# Patient Record
Sex: MALE | Race: WHITE | NOT HISPANIC OR LATINO | Employment: OTHER | URBAN - METROPOLITAN AREA
[De-identification: names, ages, dates, MRNs, and addresses within clinical notes are randomized per-mention and may not be internally consistent; named-entity substitution may affect disease eponyms.]

---

## 2017-04-18 ASSESSMENT — MIFFLIN-ST. JEOR: SCORE: 1753.6

## 2017-04-19 ENCOUNTER — ANESTHESIA - HEALTHEAST (OUTPATIENT)
Dept: SURGERY | Facility: CLINIC | Age: 82
End: 2017-04-19

## 2017-04-20 ENCOUNTER — HOME CARE/HOSPICE - HEALTHEAST (OUTPATIENT)
Dept: HOME HEALTH SERVICES | Facility: HOME HEALTH | Age: 82
End: 2017-04-20

## 2017-04-20 ENCOUNTER — SURGERY - HEALTHEAST (OUTPATIENT)
Dept: SURGERY | Facility: CLINIC | Age: 82
End: 2017-04-20

## 2017-04-20 ASSESSMENT — MIFFLIN-ST. JEOR: SCORE: 1753.6

## 2017-04-24 ENCOUNTER — HOME CARE/HOSPICE - HEALTHEAST (OUTPATIENT)
Dept: HOME HEALTH SERVICES | Facility: HOME HEALTH | Age: 82
End: 2017-04-24

## 2017-04-25 ENCOUNTER — HOME CARE/HOSPICE - HEALTHEAST (OUTPATIENT)
Dept: HOME HEALTH SERVICES | Facility: HOME HEALTH | Age: 82
End: 2017-04-25

## 2017-04-26 ENCOUNTER — HOME CARE/HOSPICE - HEALTHEAST (OUTPATIENT)
Dept: HOME HEALTH SERVICES | Facility: HOME HEALTH | Age: 82
End: 2017-04-26

## 2017-04-27 ENCOUNTER — HOME CARE/HOSPICE - HEALTHEAST (OUTPATIENT)
Dept: HOME HEALTH SERVICES | Facility: HOME HEALTH | Age: 82
End: 2017-04-27

## 2017-04-28 ENCOUNTER — HOME CARE/HOSPICE - HEALTHEAST (OUTPATIENT)
Dept: HOME HEALTH SERVICES | Facility: HOME HEALTH | Age: 82
End: 2017-04-28

## 2017-05-01 ENCOUNTER — HOME CARE/HOSPICE - HEALTHEAST (OUTPATIENT)
Dept: HOME HEALTH SERVICES | Facility: HOME HEALTH | Age: 82
End: 2017-05-01

## 2017-05-03 ENCOUNTER — HOME CARE/HOSPICE - HEALTHEAST (OUTPATIENT)
Dept: HOME HEALTH SERVICES | Facility: HOME HEALTH | Age: 82
End: 2017-05-03

## 2017-05-05 ENCOUNTER — HOME CARE/HOSPICE - HEALTHEAST (OUTPATIENT)
Dept: HOME HEALTH SERVICES | Facility: HOME HEALTH | Age: 82
End: 2017-05-05

## 2017-05-08 ENCOUNTER — HOME CARE/HOSPICE - HEALTHEAST (OUTPATIENT)
Dept: HOME HEALTH SERVICES | Facility: HOME HEALTH | Age: 82
End: 2017-05-08

## 2017-05-11 ENCOUNTER — HOME CARE/HOSPICE - HEALTHEAST (OUTPATIENT)
Dept: HOME HEALTH SERVICES | Facility: HOME HEALTH | Age: 82
End: 2017-05-11

## 2019-12-13 ENCOUNTER — AMBULATORY - HEALTHEAST (OUTPATIENT)
Dept: OCCUPATIONAL THERAPY | Facility: CLINIC | Age: 84
End: 2019-12-13

## 2019-12-13 DIAGNOSIS — M25.552 BILATERAL HIP PAIN: ICD-10-CM

## 2019-12-13 DIAGNOSIS — Z00.00 HEALTHCARE MAINTENANCE: ICD-10-CM

## 2019-12-13 DIAGNOSIS — M25.551 BILATERAL HIP PAIN: ICD-10-CM

## 2019-12-20 ENCOUNTER — OFFICE VISIT - HEALTHEAST (OUTPATIENT)
Dept: PHYSICAL THERAPY | Facility: REHABILITATION | Age: 84
End: 2019-12-20

## 2019-12-20 DIAGNOSIS — I89.0 LYMPHEDEMA: ICD-10-CM

## 2019-12-20 DIAGNOSIS — M25.551 RIGHT HIP PAIN: ICD-10-CM

## 2019-12-24 ENCOUNTER — OFFICE VISIT - HEALTHEAST (OUTPATIENT)
Dept: PHYSICAL THERAPY | Facility: REHABILITATION | Age: 84
End: 2019-12-24

## 2019-12-24 DIAGNOSIS — M25.551 RIGHT HIP PAIN: ICD-10-CM

## 2019-12-24 DIAGNOSIS — I89.0 LYMPHEDEMA: ICD-10-CM

## 2019-12-30 ENCOUNTER — OFFICE VISIT - HEALTHEAST (OUTPATIENT)
Dept: PHYSICAL THERAPY | Facility: REHABILITATION | Age: 84
End: 2019-12-30

## 2019-12-30 DIAGNOSIS — I89.0 LYMPHEDEMA: ICD-10-CM

## 2019-12-30 DIAGNOSIS — M25.551 RIGHT HIP PAIN: ICD-10-CM

## 2020-01-03 ENCOUNTER — OFFICE VISIT - HEALTHEAST (OUTPATIENT)
Dept: PHYSICAL THERAPY | Facility: REHABILITATION | Age: 85
End: 2020-01-03

## 2020-01-03 DIAGNOSIS — M25.551 RIGHT HIP PAIN: ICD-10-CM

## 2020-01-03 DIAGNOSIS — I89.0 LYMPHEDEMA: ICD-10-CM

## 2020-01-17 ENCOUNTER — TRANSFERRED RECORDS (OUTPATIENT)
Dept: HEALTH INFORMATION MANAGEMENT | Facility: CLINIC | Age: 85
End: 2020-01-17

## 2020-01-24 ASSESSMENT — MIFFLIN-ST. JEOR: SCORE: 1707.5

## 2020-01-28 ENCOUNTER — ANESTHESIA - HEALTHEAST (OUTPATIENT)
Dept: SURGERY | Facility: CLINIC | Age: 85
End: 2020-01-28

## 2020-01-28 ENCOUNTER — SURGERY - HEALTHEAST (OUTPATIENT)
Dept: SURGERY | Facility: CLINIC | Age: 85
End: 2020-01-28

## 2020-01-28 ASSESSMENT — MIFFLIN-ST. JEOR: SCORE: 1659.19

## 2020-07-17 ENCOUNTER — TRANSFERRED RECORDS (OUTPATIENT)
Dept: HEALTH INFORMATION MANAGEMENT | Facility: CLINIC | Age: 85
End: 2020-07-17

## 2020-07-18 ENCOUNTER — TRANSFERRED RECORDS (OUTPATIENT)
Dept: HEALTH INFORMATION MANAGEMENT | Facility: CLINIC | Age: 85
End: 2020-07-18

## 2020-07-19 ENCOUNTER — TRANSFERRED RECORDS (OUTPATIENT)
Dept: HEALTH INFORMATION MANAGEMENT | Facility: CLINIC | Age: 85
End: 2020-07-19

## 2020-07-20 ENCOUNTER — TRANSFERRED RECORDS (OUTPATIENT)
Dept: HEALTH INFORMATION MANAGEMENT | Facility: CLINIC | Age: 85
End: 2020-07-20

## 2020-07-21 ENCOUNTER — TRANSFERRED RECORDS (OUTPATIENT)
Dept: HEALTH INFORMATION MANAGEMENT | Facility: CLINIC | Age: 85
End: 2020-07-21

## 2020-07-22 ENCOUNTER — TRANSFERRED RECORDS (OUTPATIENT)
Dept: HEALTH INFORMATION MANAGEMENT | Facility: CLINIC | Age: 85
End: 2020-07-22

## 2020-07-23 ENCOUNTER — TRANSFERRED RECORDS (OUTPATIENT)
Dept: HEALTH INFORMATION MANAGEMENT | Facility: CLINIC | Age: 85
End: 2020-07-23

## 2020-07-24 ENCOUNTER — TRANSFERRED RECORDS (OUTPATIENT)
Dept: HEALTH INFORMATION MANAGEMENT | Facility: CLINIC | Age: 85
End: 2020-07-24

## 2020-07-25 ENCOUNTER — TRANSFERRED RECORDS (OUTPATIENT)
Dept: HEALTH INFORMATION MANAGEMENT | Facility: CLINIC | Age: 85
End: 2020-07-25

## 2020-07-26 ENCOUNTER — TRANSFERRED RECORDS (OUTPATIENT)
Dept: HEALTH INFORMATION MANAGEMENT | Facility: CLINIC | Age: 85
End: 2020-07-26

## 2020-07-28 ENCOUNTER — TRANSFERRED RECORDS (OUTPATIENT)
Dept: HEALTH INFORMATION MANAGEMENT | Facility: CLINIC | Age: 85
End: 2020-07-28

## 2020-07-29 ENCOUNTER — MEDICAL CORRESPONDENCE (OUTPATIENT)
Dept: HEALTH INFORMATION MANAGEMENT | Facility: CLINIC | Age: 85
End: 2020-07-29

## 2020-07-29 ENCOUNTER — TRANSFERRED RECORDS (OUTPATIENT)
Dept: HEALTH INFORMATION MANAGEMENT | Facility: CLINIC | Age: 85
End: 2020-07-29

## 2020-07-29 ENCOUNTER — RECORDS - HEALTHEAST (OUTPATIENT)
Dept: LAB | Facility: CLINIC | Age: 85
End: 2020-07-29

## 2020-07-30 ENCOUNTER — MEDICAL CORRESPONDENCE (OUTPATIENT)
Dept: HEALTH INFORMATION MANAGEMENT | Facility: CLINIC | Age: 85
End: 2020-07-30

## 2020-07-30 ENCOUNTER — RECORDS - HEALTHEAST (OUTPATIENT)
Dept: LAB | Facility: CLINIC | Age: 85
End: 2020-07-30

## 2020-07-30 LAB — INR PPP: >13.5 (ref 0.9–1.1)

## 2020-07-31 ENCOUNTER — RECORDS - HEALTHEAST (OUTPATIENT)
Dept: LAB | Facility: CLINIC | Age: 85
End: 2020-07-31

## 2020-07-31 ENCOUNTER — TRANSCRIBE ORDERS (OUTPATIENT)
Dept: OTHER | Age: 85
End: 2020-07-31

## 2020-07-31 DIAGNOSIS — G93.40 ENCEPHALOPATHY: Primary | ICD-10-CM

## 2020-07-31 LAB — INR PPP: 1.86 (ref 0.9–1.1)

## 2020-08-01 ENCOUNTER — RECORDS - HEALTHEAST (OUTPATIENT)
Dept: LAB | Facility: CLINIC | Age: 85
End: 2020-08-01

## 2020-08-01 LAB
ANION GAP SERPL CALCULATED.3IONS-SCNC: 5 MMOL/L (ref 5–18)
BUN SERPL-MCNC: 43 MG/DL (ref 8–28)
CALCIUM SERPL-MCNC: 8.3 MG/DL (ref 8.5–10.5)
CHLORIDE BLD-SCNC: 101 MMOL/L (ref 98–107)
CO2 SERPL-SCNC: 31 MMOL/L (ref 22–31)
CREAT SERPL-MCNC: 0.9 MG/DL (ref 0.7–1.3)
GFR SERPL CREATININE-BSD FRML MDRD: >60 ML/MIN/1.73M2
GLUCOSE BLD-MCNC: 132 MG/DL (ref 70–125)
INR PPP: 1.98 (ref 0.9–1.1)
POTASSIUM BLD-SCNC: 4.5 MMOL/L (ref 3.5–5)
SODIUM SERPL-SCNC: 137 MMOL/L (ref 136–145)

## 2020-08-03 ENCOUNTER — RECORDS - HEALTHEAST (OUTPATIENT)
Dept: LAB | Facility: CLINIC | Age: 85
End: 2020-08-03

## 2020-08-03 LAB — HGB BLD-MCNC: 10.7 G/DL (ref 14–18)

## 2020-08-04 ENCOUNTER — RECORDS - HEALTHEAST (OUTPATIENT)
Dept: LAB | Facility: CLINIC | Age: 85
End: 2020-08-04

## 2020-08-04 LAB — INR PPP: 3.55 (ref 0.9–1.1)

## 2020-08-05 ENCOUNTER — RECORDS - HEALTHEAST (OUTPATIENT)
Dept: LAB | Facility: CLINIC | Age: 85
End: 2020-08-05

## 2020-08-05 LAB — INR PPP: 3.57 (ref 0.9–1.1)

## 2020-08-06 LAB — INR PPP: 2.83 (ref 0.9–1.1)

## 2020-08-07 ENCOUNTER — RECORDS - HEALTHEAST (OUTPATIENT)
Dept: LAB | Facility: CLINIC | Age: 85
End: 2020-08-07

## 2020-08-09 LAB — INR PPP: 2.52 (ref 0.9–1.1)

## 2020-08-11 ENCOUNTER — RECORDS - HEALTHEAST (OUTPATIENT)
Dept: LAB | Facility: CLINIC | Age: 85
End: 2020-08-11

## 2020-08-12 LAB — INR PPP: 2.67 (ref 0.9–1.1)

## 2020-08-14 ENCOUNTER — RECORDS - HEALTHEAST (OUTPATIENT)
Dept: LAB | Facility: CLINIC | Age: 85
End: 2020-08-14

## 2020-08-17 ENCOUNTER — RECORDS - HEALTHEAST (OUTPATIENT)
Dept: LAB | Facility: CLINIC | Age: 85
End: 2020-08-17

## 2020-08-17 LAB
ANION GAP SERPL CALCULATED.3IONS-SCNC: 6 MMOL/L (ref 5–18)
BUN SERPL-MCNC: 35 MG/DL (ref 8–28)
CALCIUM SERPL-MCNC: 7.7 MG/DL (ref 8.5–10.5)
CHLORIDE BLD-SCNC: 101 MMOL/L (ref 98–107)
CO2 SERPL-SCNC: 29 MMOL/L (ref 22–31)
CREAT SERPL-MCNC: 1 MG/DL (ref 0.7–1.3)
GFR SERPL CREATININE-BSD FRML MDRD: >60 ML/MIN/1.73M2
GLUCOSE BLD-MCNC: 95 MG/DL (ref 70–125)
POTASSIUM BLD-SCNC: 4.2 MMOL/L (ref 3.5–5)
SODIUM SERPL-SCNC: 136 MMOL/L (ref 136–145)

## 2020-08-18 ENCOUNTER — RECORDS - HEALTHEAST (OUTPATIENT)
Dept: LAB | Facility: CLINIC | Age: 85
End: 2020-08-18

## 2020-08-18 LAB — INR PPP: 3.51 (ref 0.9–1.1)

## 2020-08-19 ENCOUNTER — RECORDS - HEALTHEAST (OUTPATIENT)
Dept: LAB | Facility: CLINIC | Age: 85
End: 2020-08-19

## 2020-08-19 LAB — INR PPP: 2.9 (ref 0.9–1.1)

## 2020-08-20 ENCOUNTER — RECORDS - HEALTHEAST (OUTPATIENT)
Dept: LAB | Facility: CLINIC | Age: 85
End: 2020-08-20

## 2020-08-20 LAB — INR PPP: 2.26 (ref 0.9–1.1)

## 2020-08-21 LAB — INR PPP: 2.02 (ref 0.9–1.1)

## 2020-08-21 NOTE — TELEPHONE ENCOUNTER
RECORDS RECEIVED FROM: External   Date of Appt: 8/25/20   NOTES (FOR ALL VISITS) STATUS DETAILS   OFFICE NOTE from referring provider Care Everywhere SEE INPATIENT NOTES   OFFICE NOTE from other specialist N/A    DISCHARGE SUMMARY from hospital Care Everywhere [facility]:  7/17/20-7/29/20   DISCHARGE REPORT from the ER N/A    OPERATIVE REPORT N/A    MEDICATION LIST Care Everywhere    IMAGING  (FOR ALL VISITS)     EMG N/A    EEG N/A    MRI (HEAD, NECK, SPINE) In process [facility]:  MRI Brain 7/22/20  MRI Brain 7/17/20   LUMBAR PUNCTURE Care Everywhere [facility]:  7/19/20   LORI Scan N/A    CT (HEAD, NECK, SPINE) In process [facility]:  CT Head 7/17/20      Action 8/21/20 MV 1.43pm   Action Taken Imaging request faxed to [facility] for:  MRI Brain 7/22/20  MRI Brain 7/17/20  CT Head 7/17/20     -8/26/2020-[facility] Images now in PACS-MR @ 620am

## 2020-08-25 ENCOUNTER — PRE VISIT (OUTPATIENT)
Dept: NEUROLOGY | Facility: CLINIC | Age: 85
End: 2020-08-25

## 2020-08-26 ENCOUNTER — OFFICE VISIT (OUTPATIENT)
Dept: NEUROLOGY | Facility: CLINIC | Age: 85
End: 2020-08-26
Attending: PSYCHIATRY & NEUROLOGY
Payer: COMMERCIAL

## 2020-08-26 VITALS — SYSTOLIC BLOOD PRESSURE: 151 MMHG | DIASTOLIC BLOOD PRESSURE: 79 MMHG | OXYGEN SATURATION: 97 % | HEART RATE: 74 BPM

## 2020-08-26 DIAGNOSIS — I67.4 HYPERTENSIVE ENCEPHALOPATHY: Primary | ICD-10-CM

## 2020-08-26 RX ORDER — ALLOPURINOL 100 MG/1
100 TABLET ORAL DAILY
COMMUNITY
Start: 2020-07-29

## 2020-08-26 RX ORDER — ASPIRIN 81 MG/1
81 TABLET ORAL DAILY
COMMUNITY
Start: 2020-07-29

## 2020-08-26 RX ORDER — NITROGLYCERIN 0.4 MG/1
0.4 TABLET SUBLINGUAL EVERY 5 MIN PRN
COMMUNITY
Start: 2020-04-07

## 2020-08-26 RX ORDER — LANSOPRAZOLE 30 MG/1
30 CAPSULE, DELAYED RELEASE ORAL DAILY
COMMUNITY
Start: 2020-07-29

## 2020-08-26 RX ORDER — LISINOPRIL 40 MG/1
40 TABLET ORAL DAILY
COMMUNITY

## 2020-08-26 RX ORDER — AMLODIPINE BESYLATE 5 MG/1
5 TABLET ORAL DAILY
COMMUNITY
End: 2021-09-21

## 2020-08-26 RX ORDER — LEVETIRACETAM 100 MG/ML
1000 SOLUTION ORAL 2 TIMES DAILY
COMMUNITY
Start: 2020-07-28

## 2020-08-26 RX ORDER — ATORVASTATIN CALCIUM 40 MG/1
40 TABLET, FILM COATED ORAL DAILY
COMMUNITY
Start: 2020-07-28

## 2020-08-26 RX ORDER — PREDNISONE 20 MG/1
60 TABLET ORAL DAILY
COMMUNITY
Start: 2020-07-29

## 2020-08-26 RX ORDER — INSULIN GLARGINE 100 [IU]/ML
8 INJECTION, SOLUTION SUBCUTANEOUS DAILY
COMMUNITY
Start: 2020-07-29

## 2020-08-26 RX ORDER — GLIMEPIRIDE 2 MG/1
2 TABLET ORAL DAILY
COMMUNITY
Start: 2020-07-29

## 2020-08-26 RX ORDER — LATANOPROST 50 UG/ML
1 SOLUTION/ DROPS OPHTHALMIC AT BEDTIME
COMMUNITY
Start: 2020-03-27

## 2020-08-26 RX ORDER — CARVEDILOL 12.5 MG/1
1.5 TABLET ORAL 2 TIMES DAILY
COMMUNITY
Start: 2020-07-29

## 2020-08-26 RX ORDER — SULFAMETHOXAZOLE/TRIMETHOPRIM 800-160 MG
1 TABLET ORAL DAILY
COMMUNITY
Start: 2020-07-29

## 2020-08-26 RX ORDER — WARFARIN SODIUM 1 MG/1
1.5 TABLET ORAL DAILY
COMMUNITY
Start: 2020-08-25

## 2020-08-26 ASSESSMENT — ENCOUNTER SYMPTOMS
MYALGIAS: 0
SEIZURES: 0
FALLS: 0
CHILLS: 0
TINGLING: 0
SHORTNESS OF BREATH: 0
WEAKNESS: 0
COUGH: 0
FOCAL WEAKNESS: 0
PALPITATIONS: 0
FEVER: 0
ORTHOPNEA: 0
SENSORY CHANGE: 0
WEIGHT LOSS: 0
PND: 0
SPEECH CHANGE: 0
DIAPHORESIS: 0

## 2020-08-26 ASSESSMENT — PAIN SCALES - GENERAL: PAINLEVEL: NO PAIN (0)

## 2020-08-26 NOTE — NURSING NOTE
Chief Complaint   Patient presents with     Consult     [facility] NEW - ENCEPHALOPATHY       Wallace Mansfield, EMT

## 2020-08-26 NOTE — PROGRESS NOTES
DEPARTMENT OF NEUROLOGY    Patient Name:  Phi Christine  MRN:  [id number]    :  1935  Date of Clinic Visit:  2020  Primary Care Provider:  No primary care provider on file.      HPI:   Phi Christine is an 85yr old male with PMHx A flutter on coumadin, MI, CAD s/p CABG (), HTN, HLD, DM2, gout, CKD3, HFrEF, prostate cancer, malignancy of the sigmoid colon, ischemic colitis s/p sigmoid colectomy (3/5/20) and reversal of colostomy (20) who presents for his initial visit to the [facility] for follow-up of recent encephalopathy    Pt had recent hospitalization at [facility] - after presenting to the ED with confusion. Found to be hypertensive 240/115. Basic neurology work-up initiated which returned with unremarkable Head CT and Brain MRI that showed stable areas of chronic infarct in the L cerebral hemispheric external border zone and MCA territories as well as chronic small vessel disease in the white matter. Admitted for encephalopathy where he also completed an EEG which showed to epileptiform activity and pt had not been exhibiting signs of seizure. 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 LP had elevated protein, but no growth, and repeat MRI was unchanged from prior. 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 [place] autoimmune antibody panel sent and ultimately returned negative, though this does not rule-out an autoimmune source. IVIG resulted in no significant improvement. PEG placed on 20 and Palliative Care consulted. Was then given IV Solumedrol 1g for 5d which showed some improvement. Pt's mental status continued to improve throughout remainder of hospital stay and was ultimately discharged to [facility] on Prednisone 60mg QD, Keppra 10ml BID for seizure prophylaxis, Bactrim for PCP prophylaxis while on steroids, and insulin while on steroids  for better glucose control. Has continued to improve throughout TCU stay and is weaning off the PEG tube. Pt was discharged back to home to [facility], an [facility] living co-[facility], on 8/24/20 where he lives with his wife, Rosario, who has been helping with his care. Arrives in clinic today to follow-up on his encephalopathy and to reconsider need for continuation of steroids and anti-seizure medications    Pt reports that he feels largely back to his normal self, but has some trouble with memory - arcenio dates - every once in a while. This is confirmed by his wife, also present in clinic today and feels he has returned to baseline. Has no trouble with ADL's, difficulty concentrating, and does not get lost on his daily walks. Does not believe he has had any side effects from his medications except for some bilateral lower leg swelling that started once he began taking the steroids. Denies any orthopnea, paroxysmal nocturnal dyspnea, CP, SOB, palpitations, heat/cold intolerance, myalgia, fever/chills, weight gain/loss, cough, numbness/tingling, weakness, or difficulty swallowing    Patient is scheduled for PEG removal on 9/4/20 and is currently on pure oral intake, no longer using his PEG tube. Is also scheduled to see his primary physician on Friday for follow-up of his other medical issues    Review of Systems   Constitutional: Negative for chills, diaphoresis, fever and weight loss.   HENT: Negative for congestion.    Respiratory: Negative for cough and shortness of breath.    Cardiovascular: Positive for leg swelling. Negative for chest pain, palpitations, orthopnea and PND.   Musculoskeletal: Negative for falls and myalgias.   Skin: Negative for rash.   Neurological: Negative for tingling, sensory change, speech change, focal weakness, seizures and weakness.     Past Medical History:   Diagnosis Date     Atrial flutter (H)     on coumadin     CAD (coronary artery disease)     s/p CABG (2004)     Cancer of sigmoid  colon (H)      CKD (chronic kidney disease) stage 3, GFR 30-59 ml/min (H)      DM2 (diabetes mellitus, type 2) (H)      Gout      HLD (hyperlipidemia)      HTN (hypertension)      Ischemic colitis (H)      MI (myocardial infarction) (H)      Prostate cancer (H)      Past Surgical History:   Procedure Laterality Date     Colostomy Reversal  06/22/2020     CORONARY ARTERY BYPASS  2004     LAPAROSCOPIC ASSISTED SIGMOID COLECTOMY  03/05/2020     Social History     Socioeconomic History     Marital status:      Spouse name: Not on file     Number of children: Not on file     Years of education: Not on file     Highest education level: Not on file   Occupational History     Not on file   Social Needs     Financial resource strain: Not on file     Food insecurity     Worry: Not on file     Inability: Not on file     Transportation needs     Medical: Not on file     Non-medical: Not on file   Tobacco Use     Smoking status: Former Smoker     Smokeless tobacco: Never Used   Substance and Sexual Activity     Alcohol use: Not Currently     Drug use: Not on file     Sexual activity: Not on file   Lifestyle     Physical activity     Days per week: Not on file     Minutes per session: Not on file     Stress: Not on file   Relationships     Social connections     Talks on phone: Not on file     Gets together: Not on file     Attends Rastafarian service: Not on file     Active member of club or organization: Not on file     Attends meetings of clubs or organizations: Not on file     Relationship status: Not on file     Intimate partner violence     Fear of current or ex partner: Not on file     Emotionally abused: Not on file     Physically abused: Not on file     Forced sexual activity: Not on file   Other Topics Concern     Not on file   Social History Narrative     Not on file     Current Outpatient Medications   Medication     allopurinol (ZYLOPRIM) 100 MG tablet     amLODIPine (NORVASC) 5 MG tablet     aspirin 81 MG EC  tablet     atorvastatin (LIPITOR) 40 MG tablet     carvedilol (COREG) 12.5 MG tablet     glimepiride (AMARYL) 2 MG tablet     insulin glargine (BASAGLAR KWIKPEN) 100 UNIT/ML pen     LANsoprazole (PREVACID) 30 MG DR capsule     latanoprost (XALATAN) 0.005 % ophthalmic solution     levETIRAcetam (KEPPRA) 100 MG/ML solution     lisinopril (ZESTRIL) 40 MG tablet     melatonin 5 MG tablet     nitroGLYcerin (NITROSTAT) 0.4 MG sublingual tablet     predniSONE (DELTASONE) 20 MG tablet     sulfamethoxazole-trimethoprim (BACTRIM DS) 800-160 MG tablet     warfarin ANTICOAGULANT (COUMADIN) 1 MG tablet     No current facility-administered medications for this visit.        Vital signs:  BP (!) 151/79 (BP Location: Right arm, Patient Position: Sitting, Cuff Size: Adult Large)   Pulse 74   SpO2 97%      Examination:     -General: NAD. sitting comfortably on the chair. Well-groomed    -HEENT: Normocephalic. PERRL. EOMI    -Chest: RRR without murmurs or bruits. Lungs clear to auscultations bilateral throughout lung fields     -Abdomen: Positive bowel sounds, soft, non-tender, no organomegaly    -Musculoskeletal: Uses walker for ambulation. No gross deformities or tenderness to palpation of large muscle groups. Has scattered areas of bruising which he attributes to IV placement during hospital/TCU stay     -Neurological:     --MS: Patient is alert, attentive, and oriented. Speech is clear and fluid. Names normally. Registration, recall and remote memory intact. Categorization and fluency intact. Mental math is somewhat poor (said there were 100 nickels in $1). Able to engage in figurative thinking. Able to perform multi-step commands    --CNs: Visual fields are full to confrontation. Pupils are briskly reactive to light. Visual fields full. Fundoscopic exam deferred. Ocular motility full without nystagmus, facial sensation intact, muscles of mastication and facial expression normal, hearing is difficult to assess 2/2 baseline  hearing loss (pt has hearing aides), palate elevation normal, sternomastoid and trapezius function normal, tongue motions normal     --Motor: Normal muscle tone and bulk. 5/5 muscle strength bilaterally throughout. Able to rise from sitting using only legs    --Reflexes: Brisk reflexes at knees and biceps and ankles. Down-going toes bilaterally    --Sensory: Light touch intact bilaterally in upper and lower extremities     --Coordination: Finger-nose-finger is intact. Negative Romberg.     --Gait: Pt typically uses walker for ambulation. Has a somewhat wide-based stance with shuffling steps when not using walker.       INVESTIGATIONS:  All available and relevant labs, imaging, and other procedures were reviewed with the patient at this visit. Those of particular significance are listed below:    MRI Brain (7/22/20): no acute intracranial pathology. Stable regions of chronic infarct within left cerebral hemispheric external border zone and middle cerebral artery territories. Stable chronic small vessel ischemic disease in white matter    IMPRESSION/RECOMMENDATIONS:   Phi Christine is an 85yr old male with substantial cardiac history but no prior neurologic history who was recently hospitalized for encephalopathy. Per chart review and interview with patient, it appears that his physicians at that time were most concerned with a limbic encephalopathy of either an autoimmune or paraneoplastic origin and he was subsequently placed on high-dose steroids and provided with seizure prophylaxis. Going over the patient's history, I am less willing to attribute his confusion to this source as brain imaging at the time showed no signs of inflammation/dammage in the limbic area and patient's symptoms seemed to be more global in nature; limbic encephalopathy presenting classically with memory loss and seizure. The amount of elevated protein on LP also appears to be more in line with his hx of DM2 rather than any influx of  immunoglobulins. In addition, pt was noted to have a BP of 240/115 at presentation and mental status improved with decrease in BP. 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 Will begin taper of Prednisone and Keppa and plan to continue Bactrim until Prednisone is completed. Will allow primary physician to adjust insulin dosing. However, should symptoms re-emerge at a later date, would highly consider re-assessing for an autoimmune/paraneoplastic source. In line with this, offered the patient a prescription for Chest CT to screen for malignancy (obtained CT Ab/Pelvis during hospitalization) so as to complete a paraneoplastic work-up, but patient declined. I do not feel that the patient requires scheduled follow-up at this time as the symptoms from his encephalopathy appear to have resolved. However, I would be glad to see Mr Christine in the future if he has any neurologic concerns.     1. Would recommend a slow taper of Prednisone. If PCP would like to pursue this faster, will defer to their judgement  2. Would recommend maintenance of Bactrim until Prednisone taper complete. If PCP would like to discontinue this earlier, will defer to their judgement  3. Would recommend a taper of Keppra  4. May schedule follow-up as needed      Patient has been seen with Dr. Ventura who agrees with my assessment and plan    Lucia Winn MD  [facility] Department of Neurology PGY1  Pager: [phone]    Attending attestation: I saw and evaluated the patient on 08/26/20 and agree with the findings and the plan of care as documented in the resident's note. Mr. Christine is a pleasant 85 year old man who was recently hospitalized at [facility] for encephalopathy. From my review of the records, he presented in mid-July in  hypertensive emergency after a relatively abrupt onset of mental status changes (normal in the morning, altered by noon). He underwent a thorough neurologic work up including head CT, brain MRI, LP for basic studies, HSV, cultures, and [place] paraneoplastic panel, EEG and repeat brain MRI. 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 MRI revealed confluent areas of white matter change most consistent with SVID, worse on the left than right, as well as old stroke in the left parietal and frontal lobes. There was no abnormal enhancement or obvious signal change in the limbic areas. EEG reports no seizures, despite episodes of myoclonus. He was started on Keppra 1G BID prophylactically. CSF studies were unrevealing except for an elevated protein of 81 which I suspect is secondary to his age and history of diabetes, especially in the absence of any other abnormalities. [place] panel returned negative and repeat brain MRI was unchanged. Review of vitals during the admission reveals that he was HTN for the first several days of his admission but this slowly improved. He is now back to his baseline, per patient and his wife.     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 In the absence of acutely abnormal MRI, EEG or CSF, I do not feel that long term immune therapy is indicated. I recommend that he taper the prednisone. We also discussed that given the initial concern for a paraneoplastic source, he would need a CT chest to be thorough (CT A/P done during admission), but patient and wife declined. There was no electrographic evidence of seizure and no overtly epileptogenic lesions on brain MRI so I also think it's reasonable to taper the Keppra. Phi and his wife are eager to do so, as well. We discussed that if he were to have any  changes in mental status, abnormal movements or concern for seizure, to please call. He will see his PCP on Friday, and if she would like to taper his prednisone faster in order to optimize blood sugars, that would be appropriate.      Lily Ventura DO   of Neurology

## 2020-08-26 NOTE — PATIENT INSTRUCTIONS
1. Taper your Prednisone as follows: starting tomorrow (8/25) take 40mg (2 tablets) of Prednisone for 1 week until 9/3. Then take 20mg (1 tablet) starting on 9/4 for 1 week until 9/10. Then take 10mg (1/2 tablet) starting 9/11 for 1 week until 9/17. Then 5mg (1/4 tablet) starting 9/18 for 1 week until 9/24. Then you may stop taking Prednisone on 9/25. If your primary doctor wishes to taper this medication faster, follow their instructions    2. Taper your Keppra as follows: starting tomorrow (8/25) take 5 ml twice per day for 1 week until 9/3. Then take 2.5 ml starting on 9/4 for 1 week until 9/10. Then you may stop taking Keppra on 9/11. If you notice any abnormal movents or behaviors, please call our clinic    3. Please continue to take your Bactrim as normal until you are done with the Prednisone. You may stop taking your Bactrim on 9/25    4. Please keep your appointment with your primary doctor for Friday (8/28) to decide on how to further dose your insulin    5. Please feel free to call our office or make an appointment if you have any further neurologic concerns

## 2020-08-26 NOTE — Clinical Note
1. Please feel free to taper Prednisone at a faster rate if desired for glucose levels  2. Patient did not express interest at this time, but could consider obtaining a Chest CT in the future to complete work-up for limbic encephalopathy

## 2020-08-26 NOTE — LETTER
2020       RE: Phi Christine  [address] [facility] [address]     Dear Colleague,    Thank you for referring your patient, Phi Christine, to the [facility] NEUROLOGY at [facility]. Please see a copy of my visit note below.      DEPARTMENT OF NEUROLOGY    Patient Name:  Phi Christine  MRN:  [id number]    :  1935  Date of Clinic Visit:  2020  Primary Care Provider:  No primary care provider on file.      HPI:   Phi Christine is an 85yr old male with PMHx A flutter on coumadin, MI, CAD s/p CABG (), HTN, HLD, DM2, gout, CKD3, HFrEF, prostate cancer, malignancy of the sigmoid colon, ischemic colitis s/p sigmoid colectomy (3/5/20) and reversal of colostomy (20) who presents for his initial visit to the [facility] for follow-up of recent encephalopathy    Pt had recent hospitalization at [facility] - after presenting to the ED with confusion. Found to be hypertensive 240/115. Basic neurology work-up initiated which returned with unremarkable Head CT and Brain MRI that showed stable areas of chronic infarct in the L cerebral hemispheric external border zone and MCA territories as well as chronic small vessel disease in the white matter. Admitted for encephalopathy where he also completed an EEG which showed to epileptiform activity and pt had not been exhibiting signs of seizure. 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 LP had elevated protein, but no growth, and repeat MRI was unchanged from prior. 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 [place] autoimmune antibody panel sent and ultimately returned negative, though this does not rule-out an autoimmune source. IVIG resulted in no significant improvement. PEG placed on 20 and Palliative Care consulted. Was then given IV Solumedrol 1g for 5d which showed some  improvement. Pt's mental status continued to improve throughout remainder of hospital stay and was ultimately discharged to [facility] TCU on Prednisone 60mg QD, Keppra 10ml BID for seizure prophylaxis, Bactrim for PCP prophylaxis while on steroids, and insulin while on steroids for better glucose control. Has continued to improve throughout TCU stay and is weaning off the PEG tube. Pt [facility] where he lives with his wife, Rosario, who has been helping with his care. Arrives in clinic today to follow-up on his encephalopathy and to reconsider need for continuation of steroids and anti-seizure medications    Pt reports that he feels largely back to his normal self, but has some trouble with memory - arcenio dates - every once in a while. This is confirmed by his wife, also present in clinic today and feels he has returned to baseline. Has no trouble with ADL's, difficulty concentrating, and does not get lost on his daily walks. Does not believe he has had any side effects from his medications except for some bilateral lower leg swelling that started once he began taking the steroids. Denies any orthopnea, paroxysmal nocturnal dyspnea, CP, SOB, palpitations, heat/cold intolerance, myalgia, fever/chills, weight gain/loss, cough, numbness/tingling, weakness, or difficulty swallowing    Patient is scheduled for PEG removal on 9/4/20 and is currently on pure oral intake, no longer using his PEG tube. Is also scheduled to see his primary physician on Friday for follow-up of his other medical issues    Review of Systems   Constitutional: Negative for chills, diaphoresis, fever and weight loss.   HENT: Negative for congestion.    Respiratory: Negative for cough and shortness of breath.    Cardiovascular: Positive for leg swelling. Negative for chest pain, palpitations, orthopnea and PND.   Musculoskeletal: Negative for falls and myalgias.   Skin:  Negative for rash.   Neurological: Negative for tingling, sensory change, speech change, focal weakness, seizures and weakness.     Past Medical History:   Diagnosis Date     Atrial flutter (H)     on coumadin     CAD (coronary artery disease)     s/p CABG (2004)     Cancer of sigmoid colon (H)      CKD (chronic kidney disease) stage 3, GFR 30-59 ml/min (H)      DM2 (diabetes mellitus, type 2) (H)      Gout      HLD (hyperlipidemia)      HTN (hypertension)      Ischemic colitis (H)      MI (myocardial infarction) (H)      Prostate cancer (H)      Past Surgical History:   Procedure Laterality Date     Colostomy Reversal  06/22/2020     CORONARY ARTERY BYPASS  2004     LAPAROSCOPIC ASSISTED SIGMOID COLECTOMY  03/05/2020     Social History     Socioeconomic History     Marital status:      Spouse name: Not on file     Number of children: Not on file     Years of education: Not on file     Highest education level: Not on file   Occupational History     Not on file   Social Needs     Financial resource strain: Not on file     Food insecurity     Worry: Not on file     Inability: Not on file     Transportation needs     Medical: Not on file     Non-medical: Not on file   Tobacco Use     Smoking status: Former Smoker     Smokeless tobacco: Never Used   Substance and Sexual Activity     Alcohol use: Not Currently     Drug use: Not on file     Sexual activity: Not on file   Lifestyle     Physical activity     Days per week: Not on file     Minutes per session: Not on file     Stress: Not on file   Relationships     Social connections     Talks on phone: Not on file     Gets together: Not on file     Attends [employer] service: Not on file     Active member of club or organization: Not on file     Attends meetings of clubs or organizations: Not on file     Relationship status: Not on file     Intimate partner violence     Fear of current or ex partner: Not on file     Emotionally abused: Not on file     Physically  abused: Not on file     Forced sexual activity: Not on file   Other Topics Concern     Not on file   Social History Narrative     Not on file     Current Outpatient Medications   Medication     allopurinol (ZYLOPRIM) 100 MG tablet     amLODIPine (NORVASC) 5 MG tablet     aspirin 81 MG EC tablet     atorvastatin (LIPITOR) 40 MG tablet     carvedilol (COREG) 12.5 MG tablet     glimepiride (AMARYL) 2 MG tablet     insulin glargine (BASAGLAR KWIKPEN) 100 UNIT/ML pen     LANsoprazole (PREVACID) 30 MG DR capsule     latanoprost (XALATAN) 0.005 % ophthalmic solution     levETIRAcetam (KEPPRA) 100 MG/ML solution     lisinopril (ZESTRIL) 40 MG tablet     melatonin 5 MG tablet     nitroGLYcerin (NITROSTAT) 0.4 MG sublingual tablet     predniSONE (DELTASONE) 20 MG tablet     sulfamethoxazole-trimethoprim (BACTRIM DS) 800-160 MG tablet     warfarin ANTICOAGULANT (COUMADIN) 1 MG tablet     No current facility-administered medications for this visit.        Vital signs:  BP (!) 151/79 (BP Location: Right arm, Patient Position: Sitting, Cuff Size: Adult Large)   Pulse 74   SpO2 97%      Examination:     -General: NAD. sitting comfortably on the chair. Well-groomed    -HEENT: Normocephalic. PERRL. EOMI    -Chest: RRR without murmurs or bruits. Lungs clear to auscultations bilateral throughout lung fields     -Abdomen: Positive bowel sounds, soft, non-tender, no organomegaly    -Musculoskeletal: Uses walker for ambulation. No gross deformities or tenderness to palpation of large muscle groups. Has scattered areas of bruising which he attributes to IV placement during hospital/TCU stay     -Neurological:     --MS: Patient is alert, attentive, and oriented. Speech is clear and fluid. Names normally. Registration, recall and remote memory intact. Categorization and fluency intact. Mental math is somewhat poor (said there were 100 nickels in $1). Able to engage in figurative thinking. Able to perform multi-step commands    --CNs:  Visual fields are full to confrontation. Pupils are briskly reactive to light. Visual fields full. Fundoscopic exam deferred. Ocular motility full without nystagmus, facial sensation intact, muscles of mastication and facial expression normal, hearing is difficult to assess 2/2 baseline hearing loss (pt has hearing aides), palate elevation normal, sternomastoid and trapezius function normal, tongue motions normal     --Motor: Normal muscle tone and bulk. 5/5 muscle strength bilaterally throughout. Able to rise from sitting using only legs    --Reflexes: Brisk reflexes at knees and biceps and ankles. Down-going toes bilaterally    --Sensory: Light touch intact bilaterally in upper and lower extremities     --Coordination: Finger-nose-finger is intact. Negative Romberg.     --Gait: Pt typically uses walker for ambulation. Has a somewhat wide-based stance with shuffling steps when not using walker.       INVESTIGATIONS:  All available and relevant labs, imaging, and other procedures were reviewed with the patient at this visit. Those of particular significance are listed below:    MRI Brain (7/22/20): no acute intracranial pathology. Stable regions of chronic infarct within left cerebral hemispheric external border zone and middle cerebral artery territories. Stable chronic small vessel ischemic disease in white matter    IMPRESSION/RECOMMENDATIONS:   Phi Christine is an 85yr old male with substantial cardiac history but no prior neurologic history who was recently hospitalized for encephalopathy. Per chart review and interview with patient, it appears that his physicians at that time were most concerned with a limbic encephalopathy of either an autoimmune or paraneoplastic origin and he was subsequently placed on high-dose steroids and provided with seizure prophylaxis. Going over the patient's history, I am less willing to attribute his confusion to this source as brain imaging at the time showed no signs of  inflammation/dammage in the limbic area and patient's symptoms seemed to be more global in nature; limbic encephalopathy presenting classically with memory loss and seizure. The amount of elevated protein on LP also appears to be more in line with his hx of DM2 rather than any influx of immunoglobulins. In addition, pt was noted to have a BP of 240/115 at presentation and mental status improved with decrease in BP. 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 Will begin taper of Prednisone and Keppa and plan to continue Bactrim until Prednisone is completed. Will allow primary physician to adjust insulin dosing. However, should symptoms re-emerge at a later date, would highly consider re-assessing for an autoimmune/paraneoplastic source. In line with this, offered the patient a prescription for Chest CT to screen for malignancy (obtained CT Ab/Pelvis during hospitalization) so as to complete a paraneoplastic work-up, but patient declined. I do not feel that the patient requires scheduled follow-up at this time as the symptoms from his encephalopathy appear to have resolved. However, I would be glad to see Mr Christine in the future if he has any neurologic concerns.     1. Would recommend a slow taper of Prednisone. If PCP would like to pursue this faster, will defer to their judgement  2. Would recommend maintenance of Bactrim until Prednisone taper complete. If PCP would like to discontinue this earlier, will defer to their judgement  3. Would recommend a taper of Keppra  4. May schedule follow-up as needed      Patient has been seen with Dr. Ventura who agrees with my assessment and plan    Lucia Winn MD  [facility] Department of Neurology PGY1  Pager: [phone]    Attending attestation: I saw and  evaluated the patient on 08/26/20 and agree with the findings and the plan of care as documented in the resident's note. Mr. Christine is a pleasant 85 year old man who was recently hospitalized at [facility] for encephalopathy. From my review of the records, he presented in mid-July in hypertensive emergency after a relatively abrupt onset of mental status changes (normal in the morning, altered by noon). He underwent a thorough neurologic work up including head CT, brain MRI, LP for basic studies, HSV, cultures, and [place] paraneoplastic panel, EEG and repeat brain MRI. 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 MRI revealed confluent areas of white matter change most consistent with SVID, worse on the left than right, as well as old stroke in the left parietal and frontal lobes. There was no abnormal enhancement or obvious signal change in the limbic areas. EEG reports no seizures, despite episodes of myoclonus. He was started on Keppra 1G BID prophylactically. CSF studies were unrevealing except for an elevated protein of 81 which I suspect is secondary to his age and history of diabetes, especially in the absence of any other abnormalities. [place] panel returned negative and repeat brain MRI was unchanged. Review of vitals during the admission reveals that he was HTN for the first several days of his admission but this slowly improved. He is now back to his baseline, per patient and his wife.     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 In the absence of acutely abnormal MRI, EEG or CSF, I do not feel that long term immune therapy is indicated. I recommend that he taper the prednisone. We also discussed that given the initial concern for a paraneoplastic source, he would need a CT chest to be thorough (CT A/P done  during admission), but patient and wife declined. There was no electrographic evidence of seizure and no overtly epileptogenic lesions on brain MRI so I also think it's reasonable to taper the Keppra. Phi and his wife are eager to do so, as well. We discussed that if he were to have any changes in mental status, abnormal movements or concern for seizure, to please call. He will see his PCP on Friday, and if she would like to taper his prednisone faster in order to optimize blood sugars, that would be appropriate.      Lily Ventura DO   of Neurology      Again, thank you for allowing me to participate in the care of your patient.      Sincerely,    Lucia Winn MD

## 2020-09-26 ENCOUNTER — COMMUNICATION - HEALTHEAST (OUTPATIENT)
Dept: SCHEDULING | Facility: CLINIC | Age: 85
End: 2020-09-26

## 2020-10-08 ENCOUNTER — COMMUNICATION - HEALTHEAST (OUTPATIENT)
Dept: SCHEDULING | Facility: CLINIC | Age: 85
End: 2020-10-08

## 2020-10-13 ENCOUNTER — RECORDS - HEALTHEAST (OUTPATIENT)
Dept: ADMINISTRATIVE | Facility: OTHER | Age: 85
End: 2020-10-13

## 2020-10-31 ENCOUNTER — HOSPITAL ENCOUNTER (OUTPATIENT)
Dept: CT IMAGING | Facility: HOSPITAL | Age: 85
Discharge: HOME | End: 2020-10-31
Attending: PHYSICIAN ASSISTANT

## 2020-10-31 DIAGNOSIS — D50.9 IRON DEFICIENCY ANEMIA, UNSPECIFIED IRON DEFICIENCY ANEMIA TYPE: ICD-10-CM

## 2021-01-21 ENCOUNTER — RECORDS - HEALTHEAST (OUTPATIENT)
Dept: ADMINISTRATIVE | Facility: OTHER | Age: 86
End: 2021-01-21

## 2021-02-01 ENCOUNTER — RECORDS - HEALTHEAST (OUTPATIENT)
Dept: ADMINISTRATIVE | Facility: OTHER | Age: 86
End: 2021-02-01

## 2021-02-01 ENCOUNTER — AMBULATORY - HEALTHEAST (OUTPATIENT)
Dept: VASCULAR SURGERY | Facility: CLINIC | Age: 86
End: 2021-02-01

## 2021-02-01 DIAGNOSIS — I87.2 STASIS DERMATITIS: ICD-10-CM

## 2021-02-08 ENCOUNTER — AMBULATORY - HEALTHEAST (OUTPATIENT)
Dept: VASCULAR SURGERY | Facility: CLINIC | Age: 86
End: 2021-02-08

## 2021-02-09 ENCOUNTER — OFFICE VISIT - HEALTHEAST (OUTPATIENT)
Dept: VASCULAR SURGERY | Facility: CLINIC | Age: 86
End: 2021-02-09

## 2021-02-09 DIAGNOSIS — I87.2 VENOUS INSUFFICIENCY OF BOTH LOWER EXTREMITIES: ICD-10-CM

## 2021-02-09 DIAGNOSIS — I87.303 VENOUS HYPERTENSION OF BOTH LOWER EXTREMITIES: ICD-10-CM

## 2021-02-09 DIAGNOSIS — R60.9 DEPENDENT EDEMA: ICD-10-CM

## 2021-02-11 ENCOUNTER — RECORDS - HEALTHEAST (OUTPATIENT)
Dept: VASCULAR ULTRASOUND | Facility: CLINIC | Age: 86
End: 2021-02-11

## 2021-02-11 DIAGNOSIS — R60.9 EDEMA, UNSPECIFIED: ICD-10-CM

## 2021-02-11 DIAGNOSIS — I87.303 CHRONIC VENOUS HYPERTENSION (IDIOPATHIC) WITHOUT COMPLICATIONS OF BILATERAL LOWER EXTREMITY: ICD-10-CM

## 2021-02-11 DIAGNOSIS — I87.2 VENOUS INSUFFICIENCY (CHRONIC) (PERIPHERAL): ICD-10-CM

## 2021-02-12 ENCOUNTER — COMMUNICATION - HEALTHEAST (OUTPATIENT)
Dept: VASCULAR SURGERY | Facility: CLINIC | Age: 86
End: 2021-02-12

## 2021-02-16 ENCOUNTER — AMBULATORY - HEALTHEAST (OUTPATIENT)
Dept: VASCULAR SURGERY | Facility: CLINIC | Age: 86
End: 2021-02-16

## 2021-02-19 ENCOUNTER — AMBULATORY - HEALTHEAST (OUTPATIENT)
Dept: VASCULAR SURGERY | Facility: CLINIC | Age: 86
End: 2021-02-19

## 2021-02-23 ENCOUNTER — AMBULATORY - HEALTHEAST (OUTPATIENT)
Dept: VASCULAR SURGERY | Facility: CLINIC | Age: 86
End: 2021-02-23

## 2021-02-26 ENCOUNTER — AMBULATORY - HEALTHEAST (OUTPATIENT)
Dept: VASCULAR SURGERY | Facility: CLINIC | Age: 86
End: 2021-02-26

## 2021-03-02 ENCOUNTER — OFFICE VISIT - HEALTHEAST (OUTPATIENT)
Dept: VASCULAR SURGERY | Facility: CLINIC | Age: 86
End: 2021-03-02

## 2021-03-02 DIAGNOSIS — I87.303 VENOUS HYPERTENSION OF BOTH LOWER EXTREMITIES: ICD-10-CM

## 2021-03-02 DIAGNOSIS — R60.9 DEPENDENT EDEMA: ICD-10-CM

## 2021-03-02 DIAGNOSIS — I87.2 VENOUS INSUFFICIENCY OF BOTH LOWER EXTREMITIES: ICD-10-CM

## 2021-03-08 ENCOUNTER — AMBULATORY - HEALTHEAST (OUTPATIENT)
Dept: VASCULAR SURGERY | Facility: CLINIC | Age: 86
End: 2021-03-08

## 2021-03-16 ENCOUNTER — OFFICE VISIT - HEALTHEAST (OUTPATIENT)
Dept: VASCULAR SURGERY | Facility: CLINIC | Age: 86
End: 2021-03-16

## 2021-03-16 DIAGNOSIS — I87.303 VENOUS HYPERTENSION OF BOTH LOWER EXTREMITIES: ICD-10-CM

## 2021-03-16 DIAGNOSIS — I87.2 VENOUS INSUFFICIENCY OF RIGHT LEG: ICD-10-CM

## 2021-03-16 DIAGNOSIS — I83.891 SYMPTOMATIC VARICOSE VEINS OF RIGHT LOWER EXTREMITY: ICD-10-CM

## 2021-03-29 ENCOUNTER — COMMUNICATION - HEALTHEAST (OUTPATIENT)
Dept: VASCULAR SURGERY | Facility: CLINIC | Age: 86
End: 2021-03-29

## 2021-04-15 ENCOUNTER — COMMUNICATION - HEALTHEAST (OUTPATIENT)
Dept: VASCULAR SURGERY | Facility: CLINIC | Age: 86
End: 2021-04-15

## 2021-04-27 ENCOUNTER — RECORDS - HEALTHEAST (OUTPATIENT)
Dept: ADMINISTRATIVE | Facility: OTHER | Age: 86
End: 2021-04-27

## 2021-04-27 ENCOUNTER — RECORDS - HEALTHEAST (OUTPATIENT)
Dept: VASCULAR ULTRASOUND | Facility: CLINIC | Age: 86
End: 2021-04-27

## 2021-04-27 DIAGNOSIS — I87.2 VENOUS INSUFFICIENCY (CHRONIC) (PERIPHERAL): ICD-10-CM

## 2021-04-27 DIAGNOSIS — I83.891 VARICOSE VEINS OF RIGHT LOWER EXTREMITY WITH OTHER COMPLICATIONS: ICD-10-CM

## 2021-04-27 DIAGNOSIS — I87.303 CHRONIC VENOUS HYPERTENSION (IDIOPATHIC) WITHOUT COMPLICATIONS OF BILATERAL LOWER EXTREMITY: ICD-10-CM

## 2021-04-29 ENCOUNTER — RECORDS - HEALTHEAST (OUTPATIENT)
Dept: VASCULAR ULTRASOUND | Facility: CLINIC | Age: 86
End: 2021-04-29

## 2021-04-29 DIAGNOSIS — I87.2 VENOUS INSUFFICIENCY (CHRONIC) (PERIPHERAL): ICD-10-CM

## 2021-04-29 DIAGNOSIS — I83.891 VARICOSE VEINS OF RIGHT LOWER EXTREMITY WITH OTHER COMPLICATIONS: ICD-10-CM

## 2021-04-29 DIAGNOSIS — I87.303 CHRONIC VENOUS HYPERTENSION (IDIOPATHIC) WITHOUT COMPLICATIONS OF BILATERAL LOWER EXTREMITY: ICD-10-CM

## 2021-05-25 ENCOUNTER — OFFICE VISIT - HEALTHEAST (OUTPATIENT)
Dept: VASCULAR SURGERY | Facility: CLINIC | Age: 86
End: 2021-05-25

## 2021-05-25 ENCOUNTER — RECORDS - HEALTHEAST (OUTPATIENT)
Dept: ADMINISTRATIVE | Facility: CLINIC | Age: 86
End: 2021-05-25

## 2021-05-25 DIAGNOSIS — I83.891 SYMPTOMATIC VARICOSE VEINS OF RIGHT LOWER EXTREMITY: ICD-10-CM

## 2021-05-25 DIAGNOSIS — I87.2 VENOUS INSUFFICIENCY OF RIGHT LEG: ICD-10-CM

## 2021-05-27 VITALS
DIASTOLIC BLOOD PRESSURE: 64 MMHG | HEART RATE: 60 BPM | SYSTOLIC BLOOD PRESSURE: 134 MMHG | RESPIRATION RATE: 20 BRPM | TEMPERATURE: 98.2 F

## 2021-05-27 VITALS
RESPIRATION RATE: 24 BRPM | DIASTOLIC BLOOD PRESSURE: 64 MMHG | TEMPERATURE: 98.1 F | HEART RATE: 72 BPM | SYSTOLIC BLOOD PRESSURE: 110 MMHG

## 2021-05-27 VITALS
DIASTOLIC BLOOD PRESSURE: 68 MMHG | TEMPERATURE: 98.2 F | RESPIRATION RATE: 15 BRPM | SYSTOLIC BLOOD PRESSURE: 132 MMHG | HEART RATE: 60 BPM

## 2021-05-27 VITALS
HEART RATE: 64 BPM | DIASTOLIC BLOOD PRESSURE: 64 MMHG | TEMPERATURE: 97.9 F | SYSTOLIC BLOOD PRESSURE: 142 MMHG | RESPIRATION RATE: 24 BRPM

## 2021-05-27 VITALS
TEMPERATURE: 98.4 F | RESPIRATION RATE: 28 BRPM | SYSTOLIC BLOOD PRESSURE: 132 MMHG | HEART RATE: 72 BPM | DIASTOLIC BLOOD PRESSURE: 72 MMHG

## 2021-05-30 VITALS — WEIGHT: 239 LBS | BODY MASS INDEX: 36.22 KG/M2 | HEIGHT: 68 IN

## 2021-05-31 ENCOUNTER — RECORDS - HEALTHEAST (OUTPATIENT)
Dept: ADMINISTRATIVE | Facility: CLINIC | Age: 86
End: 2021-05-31

## 2021-06-04 VITALS — HEIGHT: 68 IN | BODY MASS INDEX: 33.07 KG/M2 | WEIGHT: 218.19 LBS

## 2021-06-04 NOTE — PROGRESS NOTES
[facility] Rehabilitation Certification Request    December 20, 2019      Patient: Phi Christine  MR Number: [id number]  YOB: 1935  Date of Visit: 12/20/2019      Dear Dr.Darin Garcia  Thank you for this referral.   We are seeing Phi Christine for Physical Therapy of lymphedema therapy.    Medicare and/or Medicaid requires physician review and approval of the treatment plan. Please review the plan of care and verify that you agree with the therapy plan of care by co-signing this note.      Plan of Care  Authorization / Certification Start Date: 12/20/19  Authorization / Certification End Date: 03/19/20  Authorization / Certification Number of Visits: 10  Communication with: Referral Source  Patient Related Instruction: Nature of Condition;Treatment plan and rationale;Self Care instruction;Basis of treatment;Body mechanics;Posture;Precautions;Next steps;Expected outcome  Times per Week: 2-3  Number of Weeks: 4-6  Number of Visits: up to 10 sessions  Discharge Planning: when goals have been met or a plateau in progress achieved   Therapeutic Exercise: ROM;Stretching;Strengthening;Lymphedema  Neuromuscular Reeducation: kinesio tape;balance/proprioception;core  Manual Therapy: soft tissue mobilization;myofascial release;lymphatic drainage massage  Functional Training (ADL's): skin care;self care;lymphedema precautions;bandage/garment wear and care  Equipment: theraband;compression bandages      Goals:  No data recorded  Patient will have a decreased volume in : bilateral;LE;by 5%  Patient will have decreased fibrosis, scar tissue for improved lymphatic mobilitiy : in 6 weeks  Patient will perform, verbalize self-management of: skin care;self-monitoring;exercise;compression wear;compression care;infection prevention;in 6 weeks;massage;self-compression        If you have any questions or concerns, please don't hesitate to call.    Sincerely,      Shruthi Magdaleno, PT, DPT, CLT         Physician  recommendation:     ___ Follow therapist's recommendation        ___ Modify therapy      *Physician co-signature indicates they certify the need for these services furnished within this plan and while under their care.      [facility]   Lymphedema Initial Evaluation      Patient Name: Phi Christine   Preferred Name: Theodore   Date of evaluation: 12/20/2019  Referral Diagnosis:  B Hip pain, lymphedema therapy   Referring provider: Leonardo Garcia  Visit Diagnosis:     ICD-10-CM    1. Lymphedema I89.0    2. Right hip pain M25.551        Assessment:     Phi Christine is a 84 y.o. male who presents to therapy today with chief complaints of bilateral LE edema, R > L side with pain in his hip limiting it patient will benefit from skilled therapy to decrease edema with use fo complete decongestive therapy including compression, MLD and exercises. . Onset date of sx was 5 years ago and in the last couple of months the edema is getting worse.  Pt reported h/o quadruple bypass with graft from let LE.  Pain symptoms are heaviness, achiness, firmness in the legs.  Functional impairments include walking, steps, ADLs.  Pt demo's signs and sx consistent with B LE edema, likely venous component .     Impairments in  pain, posture, ROM, joint mobility, strength  The POC is dynamic and will be modified on an ongoing basis.  Barriers to achieving goals as noted in the assessment section may affect outcome.  Prognosis to achieve goals is  good   Pt. is appropriate for skilled PT intervention as outlined in the Plan of Care (POC).  Pt. is a good candidate for skilled PT services to improve pain levels and function.    Goals:   No data recorded  Patient will have a decreased volume in : bilateral;LE;by 5%  Patient will have decreased fibrosis, scar tissue for improved lymphatic mobilitiy : in 6 weeks  Patient will perform, verbalize self-management of: skin care;self-monitoring;exercise;compression wear;compression  care;infection prevention;in 6 weeks;massage;self-compression      Patient's expectations/goals are realistic.    Barriers to Learning or Achieving Goals:  Chronicity of the problem.    Lymphedema Category:  III - Stage 1 with Low Functional Demand       Plan / Patient Instructions:      Plan of Care:   Authorization / Certification Start Date: 12/20/19  Authorization / Certification End Date: 03/19/20  Authorization / Certification Number of Visits: 10  Communication with: Referral Source  Patient Related Instruction: Nature of Condition;Treatment plan and rationale;Self Care instruction;Basis of treatment;Body mechanics;Posture;Precautions;Next steps;Expected outcome  Times per Week: 2-3  Number of Weeks: 4-6  Number of Visits: up to 10 sessions  Discharge Planning: when goals have been met or a plateau in progress achieved   Therapeutic Exercise: ROM;Stretching;Strengthening;Lymphedema  Neuromuscular Reeducation: kinesio tape;balance/proprioception;core  Manual Therapy: soft tissue mobilization;myofascial release;lymphatic drainage massage  Functional Training (ADL's): skin care;self care;lymphedema precautions;bandage/garment wear and care  Equipment: theraband;compression bandages      Plan for next visit: continue MLD, assess tolerance to the tubes and added layers of compression as needed, progress and add lymphedema exercises      Subjective:         Social information:   Living Situation:condo and lives with wife, stairs to home but elevator available as well    Occupation:retired   Work Status:NA   Equipment Available: None, Cane     History of Present Illness:    Phi is a 84 y.o. male who presents to therapy today with complaints of bilateral LE edema, right hip pain. The edema started about 5 years ago, just in his ankles initiallybut has been getting worse until the point when the legs were weeping. Currently has a large scrape on his shin when toweling off from a shower and rubbed the skin off with  a towel. Healing Blood pressure is under control now, he had a quadruple bypass surgery 17 years ago. The vein for he graft was taken from the left leg. He will see the cardiologist next month, he has a couple of MRIs and US ruled out DVTs.   Has had TKA bilaterally 2017 for the right and at least 8 year of the left.   He would like a total hip replacement but needs to get edema under control first.       Pain Ratin  Pain rating at best: 0  Pain rating at worst: 2  Pain description: aching and tight, heavy, aching pain from edema    Functional limitations are described as occurring with:   ascending and descending stairs or curbs  balance  bending  performing routine daily activities  sleeping  walking .    Patient reports benefit from:  rest         Objective:        Right Lower Extremity Total Estimated Volume (cm3): 6386.54  Left Lower Extremity Total Estimated Volume (cm3): 5724.48  Lower Extremity Swelling Comparison (%): 11.57 %     Lower Extremity Lymphedema  2019   R Big Toe (cm) 8.3   R 10cm from tip of second toe (cm) 22.7   R 20cm from tip of second toe (cm) 25.6   R 30cm from tip of second toe (cm) 31.2   R 40cm from tip of second toe (cm) 38.6   R 50cm from tip of second toe (cm) 39   R 60cm from tip of second toe (cm) 44.5   R 70cm from tip of second toe (cm) 48.4   Right Lower Extremity Total Estimated Volume (cm3) 6386.54   L Big Toe (cm) 8.3   L 10cm from tip of second toe (cm) 22.4   L 20cm from tip of second toe (cm) 24.5   L 30cm from tip of second toe (cm) 29   L 40cm from tip of second toe (cm) 35.5   L 50cm from tip of second toe (cm) 36   L 60cm from tip of second toe (cm) 43   L 70cm from tip of second toe (cm) 47.3   Left Lower Extremity Total Estimated Volume (cm3) 5724.48   Swelling Description Right>Left   Lower Extremity Swelling Comparison (%) 11.57     Examination  1. Lymphedema     2. Right hip pain       Involved side: Bilateral and R > L      Surgery: TKA bilaterally,  right knee 2017, needs LARS but wont do until decreased edema and therapy on hip at [place] completed   Treatments: none  Precautions/Restrictions: None  Involved area: Bilateral and R > L  Leg  Edema: Pitting at grade, 1+, Minimal and Stemmers sign  negative  Induration: Yes  Fibrosis: mild  Temperature: Normal  Sensation: Normal  Skin color: Reddened  Skin texture: Dry, Shiny and Telangiectasia  Scars: Location: long anterior shin  Size: NA  Wounds: Present Location: anterior right shin mid way  Size: >50 cent piece  Observation scabbed up, healing areas with new skin around it, no signs or symptoms of infections notes, no weeping or ozzing seen today   Muscle tone: Normal  Gait: antalgic, fwd bend at the hip due to needing replacement       Treatment Today   12/20/2019  TREATMENT MINUTES COMMENTS   Evaluation 30    Self-care/ Home management     Manual therapy 30 Light MLD to B LEs modified to short routine in legs and up hips     Applied tubular compressor  comperm size F to B LE     Discussed what the plan of care would be, medical bandages as needed, exercises and MLD  Discussed skin care and wound care if needed    Neuromuscular Re-education     Therapeutic Activity     Therapeutic Exercises     Gait training     Modality__________________                Total     Blank areas are intentional and mean the treatment did not include these items.            Shruthi Magdaleno  12/20/2019  7:07 AM

## 2021-06-04 NOTE — PROGRESS NOTES
[facility] Rehabilitation Rehabilitation Daily Progress     Patient Name: Phi Christine   Preferred Name: Theodore   Date: 2019  Visit #: 3  PTA visit #:  -  Date of evaluation: 2019  Referral Diagnosis:  B Hip pain, lymphedema therapy   Referring provider: Leonardo Garcia  Visit Diagnosis:     ICD-10-CM    1. Lymphedema I89.0    2. Right hip pain M25.551        Diagnoses and all orders for this visit:    Lymphedema    Right hip pain       .OPTPTPRECAUTION: USES BOLSTER AND 2 PILLOWS UNDER HEAD AND UPPER BACK      Assessment:     Bilateral lower extremities with decreased edema. Good response to MLD and compression.  HEP/POC compliance is  good .  Patient demonstrates understanding/independence with home program.  Therapy interventions being performed are medically necessary, are being delivered according to accepted standards of medical practice, and require the skills of a therapist to perform the services.      Goal Status:  No data recorded  Patient will have a decreased volume in : bilateral;LE;by 5%;Progressing toward  Patient will have decreased fibrosis, scar tissue for improved lymphatic mobilitiy : in 6 weeks;Progressing toward  Patient will perform, verbalize self-management of: skin care;self-monitoring;exercise;compression wear;compression care;infection prevention;in 6 weeks;massage;self-compression;Progressing toward      Plan / Patient Education:     Continue with initial plan of care.  Progress with home program as tolerated.  Plan for next visit: continue MLD, assess tolerance to the tubes and response, added layers of compression if needed, progress and add lymphedema exercises   Discussed compression garments and coordinate getting them. Next visit is the last one schedule  Subjective:     Pain Ratin  The comperm is working fine, I'm doing my exercises.  Has 4 total appointments here and then will do therapy for his hip at [facility] x 5 sessions before seeing the MD again.        Objective:     Caregiver present: No    Observation of swelling: B LEs is better.     Volume measurements taken:  No      Skin condition is:  Intact.    Compression:  Comperm on bilateral lower low legs.    Medication for infection:  No    Treatment Today   12/30/2019  TREATMENT MINUTES COMMENTS   Evaluation     Self-care/ Home management     Manual therapy 37  Manual therapy: manual lymph drainage for bilateral lower extremities lymphedema  Deep abdominal breath, neck effleurage, short neck, shoulder collectors, bilateral axilla, bilateral inguinal, anterior inguinal to axilla anastomosis on right side and left side, left lower extremity evacuation, right lower extremity evacuation, abdomen, neck effleurage.    Applied comprem compressor  size F to B LEs today    Neuromuscular Re-education     Therapeutic Activity     Therapeutic Exercises 15  Instructed and performed LE lymphedema exercises, patient had no questions.    Gait training     Modality__________________                Total 52     Blank areas are intentional and mean the treatment did not include these items.       Rachel Richey PT, STEVE-RC  12/30/2019

## 2021-06-04 NOTE — PROGRESS NOTES
[facility] Rehabilitation Rehabilitation Daily Progress     Patient Name: Phi Christine   Preferred Name: Theodore   Date: 2019  Visit #: 2  PTA visit #:    Date of evaluation: 2019  Referral Diagnosis:  B Hip pain, lymphedema therapy   Referring provider: Leonardo Garcia  Visit Diagnosis:     ICD-10-CM    1. Lymphedema I89.0    2. Right hip pain M25.551        Diagnoses and all orders for this visit:    Lymphedema    Right hip pain       .OPTPTPRECAUTION      Assessment:     Patient reducing with compressor  on, did well with MLD today and noted increased healing on the wound on anterior lower leg. Exercises given today as well.   HEP/POC compliance is  good .  Patient demonstrates understanding/independence with home program.  Therapy interventions being performed are medically necessary, are being delivered according to accepted standards of medical practice, and require the skills of a therapist to perform the services.      Goal Status:  No data recorded  Patient will have a decreased volume in : bilateral;LE;by 5%  Patient will have decreased fibrosis, scar tissue for improved lymphatic mobilitiy : in 6 weeks  Patient will perform, verbalize self-management of: skin care;self-monitoring;exercise;compression wear;compression care;infection prevention;in 6 weeks;massage;self-compression      Plan / Patient Education:     Continue with initial plan of care.  Progress with home program as tolerated.  Plan for next visit: continue MLD, assess tolerance to the tubes and response, added layers of compression if needed, progress and add lymphedema exercises   Discussed compression garments and coordinate getting them.   Subjective:     Pain Ratin  Doing well, feels like legs are down, the compressor  were comfortable. The leg is healing well.   Has 4 total appointments here and then will do therapy for his hip at [facility] x 5 sessions before seeing the MD again.       Objective:      Caregiver present: No    Observation of swelling: B LEs is better.     Volume measurements taken:  No      Skin condition is:  better, more healing noted on the abrashion anterior shin      Compression:  No compression. Last worn this AM .    Medication for infection:  No    Treatment Today   12/24/2019  TREATMENT MINUTES COMMENTS   Evaluation     Self-care/ Home management     Manual therapy 38  Manual therapy: manual lymph drainage for bilateral lower extremities lymphedema  Deep abdominal breath, neck effleurage, short neck, shoulder collectors, bilateral axilla, bilateral inguinal, anterior inguinal to axilla anastomosis on right side and left side, left lower extremity evacuation, right lower extremity evacuation, abdomen, neck effleurage.    Applied comprem compressor  size F to B LEs today    Neuromuscular Re-education     Therapeutic Activity     Therapeutic Exercises 15  Instructed patient in LE lymphedema exercises, demonstration and performed in clinic, handout given today    Gait training     Modality__________________                Total 53     Blank areas are intentional and mean the treatment did not include these items.       Shruthi Magdaleno PT, DPT, CLT  12/24/2019

## 2021-06-04 NOTE — PROGRESS NOTES
[facility] Rehabilitation Rehabilitation Daily Progress/Discharge Summary     Patient Name: Phi Christine   Preferred Name: Theodore   Date: 1/3/2020  Visit #: 4  PTA visit #:  -  Date of evaluation: 2019  Referral Diagnosis:  B Hip pain, lymphedema therapy   Referring provider: Leonardo Garcia  Visit Diagnosis:     ICD-10-CM    1. Lymphedema I89.0    2. Right hip pain M25.551        Diagnoses and all orders for this visit:    Lymphedema    Right hip pain       .OPTPTPRECAUTION: USES BOLSTER AND 2 PILLOWS UNDER HEAD AND UPPER BACK      Assessment:      Bilateral lower extremities with decreased edema. Good response to MLD and compression.  HEP/POC compliance is  good .  Patient demonstrates understanding/independence with home program.  patient has met all goals and at this time will be dicharged from therapy with I HEP   Therapy interventions being performed are medically necessary, are being delivered according to accepted standards of medical practice, and require the skills of a therapist to perform the services.      Goal Status: Goal Met   Patient will have a decreased volume in : bilateral;LE;by 5%;Progressing toward  Patient will have decreased fibrosis, scar tissue for improved lymphatic mobilitiy : in 6 weeks;Progressing toward  Patient will perform, verbalize self-management of: skin care;self-monitoring;exercise;compression wear;compression care;infection prevention;in 6 weeks;massage;self-compression;Progressing toward      Plan / Patient Education:     Continue with initial plan of care.  Progress with home program as tolerated.  Plan for next visit: Discharge today with I HEP and self management techniques.     Subjective:     Pain Ratin   Patient has been doing well overall, the legs feel good, wound is healing. Wearing compression daily and doing exercises most days.   Started therapy at [place] for his hip and it went ok.   Today will be his last day for therapy here.      Objective:      Caregiver present: No    Observation of swelling: B LEs is better.     Volume measurements taken:  yes  Lower Extremity Lymphedema  12/20/2019 1/3/2020   R Big Toe (cm) 8.3 8.5   R 10cm from tip of second toe (cm) 22.7 22.4   R 20cm from tip of second toe (cm) 25.6 25.6   R 30cm from tip of second toe (cm) 31.2 28.7   R 40cm from tip of second toe (cm) 38.6 37.1   R 50cm from tip of second toe (cm) 39 38.5   R 60cm from tip of second toe (cm) 44.5 43.4   R 70cm from tip of second toe (cm) 48.4 48.8   Right Lower Extremity Total Estimated Volume (cm3) 6386.54 6078.61   Right LE change of volume from last visit (%) - -4.82   Right LE change of volume from initial (%) - -4.82   L Big Toe (cm) 8.3 8.3   L 10cm from tip of second toe (cm) 22.4 22.2   L 20cm from tip of second toe (cm) 24.5 23.2   L 30cm from tip of second toe (cm) 29 25.6   L 40cm from tip of second toe (cm) 35.5 33.5   L 50cm from tip of second toe (cm) 36 35   L 60cm from tip of second toe (cm) 43 43   L 70cm from tip of second toe (cm) 47.3 46.7   Left Lower Extremity Total Estimated Volume (cm3) 5724.48 5333.17   Left LE change of volume from last visit (%) - -6.84   Left LE change of volume from initial (%) - -6.84   Swelling Description Right>Left Right>Left   Lower Extremity Swelling Comparison (%) 11.57 13.98     Skin condition is:  Intact.    Compression:  Comperm on bilateral lower low legs.    Medication for infection:  No    Treatment Today   1/3/2020  TREATMENT MINUTES COMMENTS   Evaluation     Self-care/ Home management     Manual therapy 53  Measurements taken today for discharge day     Manual therapy: manual lymph drainage for bilateral lower extremities lymphedema  Deep abdominal breath, neck effleurage, short neck, shoulder collectors, bilateral axilla, bilateral inguinal, anterior inguinal to axilla anastomosis on right side and left side, left lower extremity evacuation, right lower extremity evacuation, abdomen, neck  effleurage.    Applied comprem compressor  size F to B LEs today     Cut two more for each leg for use at home, patient long term will purchase compression socks from a location he has in the past.    Neuromuscular Re-education     Therapeutic Activity     Therapeutic Exercises 5  Instructed and performed LE lymphedema exercises, patient had no questions.    Gait training     Modality__________________                Total 58     Blank areas are intentional and mean the treatment did not include these items.     Shruthi Magdaleno PT, CLT-RC  1/3/2020

## 2021-06-05 VITALS
HEART RATE: 84 BPM | TEMPERATURE: 97.5 F | DIASTOLIC BLOOD PRESSURE: 84 MMHG | OXYGEN SATURATION: 96 % | WEIGHT: 211 LBS | BODY MASS INDEX: 32.08 KG/M2 | SYSTOLIC BLOOD PRESSURE: 138 MMHG | RESPIRATION RATE: 28 BRPM

## 2021-06-05 VITALS
SYSTOLIC BLOOD PRESSURE: 140 MMHG | TEMPERATURE: 98.1 F | BODY MASS INDEX: 31.95 KG/M2 | WEIGHT: 210.1 LBS | DIASTOLIC BLOOD PRESSURE: 80 MMHG | HEART RATE: 80 BPM

## 2021-06-05 VITALS
BODY MASS INDEX: 31.78 KG/M2 | TEMPERATURE: 97.9 F | RESPIRATION RATE: 18 BRPM | SYSTOLIC BLOOD PRESSURE: 136 MMHG | WEIGHT: 209 LBS | HEART RATE: 60 BPM | DIASTOLIC BLOOD PRESSURE: 76 MMHG

## 2021-06-05 NOTE — ANESTHESIA CARE TRANSFER NOTE
Last vitals:   Vitals:    01/28/20 1220   BP: 115/59   Pulse: 69   Resp: 12   Temp: 36.6  C (97.8  F)   SpO2: 98%     Patient's level of consciousness is drowsy  Spontaneous respirations: yes  Maintains airway independently: yes  Dentition unchanged: yes  Oropharynx: oropharynx clear of all foreign objects    QCDR Measures:  ASA# 20 - Surgical Safety Checklist: WHO surgical safety checklist completed prior to induction    PQRS# 430 - Adult PONV Prevention: NA - Not adult patient, not GA or 3 or more risk factors NOT present  ASA# 8 - Peds PONV Prevention: NA - Not pediatric patient, not GA or 2 or more risk factors NOT present  PQRS# 424 - Rhonda-op Temp Management: 4559F - At least one body temp DOCUMENTED => 35.5C or 95.9F within required timeframe  PQRS# 426 - PACU Transfer Protocol: - Transfer of care checklist used  ASA# 14 - Acute Post-op Pain: ASA14B - Patient did NOT experience pain >= 7 out of 10

## 2021-06-05 NOTE — ANESTHESIA PREPROCEDURE EVALUATION
Anesthesia Evaluation      Patient summary reviewed     Airway   Mallampati: I  Neck ROM: full   Pulmonary - negative ROS    breath sounds clear to auscultation                         Cardiovascular   Exercise tolerance: > or = 4 METS  (+) hypertension, past MI, CAD, CABG/stent, dysrhythmias, CHF, ,     Rhythm: regular  Rate: normal,      ROS comment: TTE 1/17/20: Normal global left ventricular systolic function. Calculated left ventricular ejection   fraction is 62 %.   2. Regional wall motion abnormalities are present. The apical septal and apical inferior   segments are akinetic.   3. Normal right ventricular size and systolic function.   4. Mild left atrial enlargement.   5. No significant valvular heart disease.     Neuro/Psych    (+) neuromuscular disease,      Endo/Other    (+) diabetes mellitus type 2, obesity,      GI/Hepatic/Renal    (+)   chronic renal disease CRI,           Dental    (+) lower dentures and upper dentures                       Anesthesia Plan  Planned anesthetic: MAC    ASA 3     Anesthetic plan and risks discussed with: patient    Post-op plan: routine recovery

## 2021-06-05 NOTE — ANESTHESIA POSTPROCEDURE EVALUATION
Patient: Phi Christine  Procedure(s):  COLONOSCOPY WITH TATTOO  Anesthesia type: MAC    Patient location: Phase II Recovery  Last vitals:   Vitals Value Taken Time   /76 1/28/2020  1:15 PM   Temp 36.6  C (97.8  F) 1/28/2020 12:20 PM   Pulse 69 1/28/2020  1:11 PM   Resp 14 1/28/2020 12:30 PM   SpO2 99 % 1/28/2020  1:12 PM   Vitals shown include unvalidated device data.  Post vital signs: stable  Level of consciousness: awake, alert, oriented and responds to simple questions  Post-anesthesia pain: pain controlled  Post-anesthesia nausea and vomiting: no  Pulmonary: unassisted, return to baseline  Cardiovascular: stable and blood pressure at baseline  Hydration: adequate  Anesthetic events: no    QCDR Measures:  ASA# 11 - Rhonda-op Cardiac Arrest: ASA11B - Patient did NOT experience unanticipated cardiac arrest  ASA# 12 - Rhonda-op Mortality Rate: ASA12B - Patient did NOT die  ASA# 13 - PACU Re-Intubation Rate: ASA13B - Patient did NOT require a new airway mgmt  ASA# 10 - Composite Anes Safety: ASA10A - No serious adverse event    Additional Notes:

## 2021-06-10 NOTE — ANESTHESIA PREPROCEDURE EVALUATION
Anesthesia Evaluation      Patient summary reviewed   No history of anesthetic complications     Airway   Mallampati: II  Neck ROM: full   Pulmonary - negative ROS and normal exam    breath sounds clear to auscultation  (+) shortness of breath (Deconditioned),                          Cardiovascular - normal exam  (+) hypertension well controlled, past MI (2001), CAD, CABG/stent (2001), CHF (EF50%), , hypercholesterolemia,     ECG reviewed (Sinus bradycardia)  Rhythm: regular  Rate: normal,         Neuro/Psych - negative ROS     Endo/Other    (+) diabetes mellitus type 2 well controlled, obesity (BMI 36),      GI/Hepatic/Renal    (+)   chronic renal disease (CKD III),           Dental    (+) upper dentures                       Anesthesia Plan  Planned anesthetic: spinal and peripheral nerve block  Adductor canal, tibial n blocks  ASA 3     Anesthetic plan and risks discussed with: patient and spouse    Post-op plan: routine recovery

## 2021-06-10 NOTE — ANESTHESIA CARE TRANSFER NOTE
Last vitals:   Vitals:    04/20/17 1037   BP: 133/60   Pulse: 64   Resp: 17   Temp: 36.7  C (98.1  F)   SpO2: 99%     Patient's level of consciousness is drowsy  Spontaneous respirations: yes  Maintains airway independently: yes  Dentition unchanged: yes  Oropharynx: oropharynx clear of all foreign objects    QCDR Measures:  ASA# 20 - Surgical Safety Checklist: ASA20A - Safety Checks Done  PQRS# 430 - Adult PONV Prevention: 4558F - Pt received => 2 anti-emetic agents (different classes) preop & intraop  ASA# 8 - Peds PONV Prevention: NA - Not pediatric patient, not GA or 2 or more risk factors NOT present  PQRS# 424 - Rhonda-op Temp Management: 4559F - At least one body temp DOCUMENTED => 35.5C or 95.9F within required timeframe  PQRS# 426 - PACU Transfer Protocol: - Transfer of care checklist used  ASA# 14 - Acute Post-op Pain: ASA14B - Patient did NOT experience pain >= 7 out of 10    I completed my SBAR handoff to the receiving nurse per policy and procedure.

## 2021-06-10 NOTE — ANESTHESIA PROCEDURE NOTES
Peripheral Block    Patient location during procedure: pre-op  Start time: 4/20/2017 7:52 AM  End time: 4/20/2017 7:57 AM  post-op analgesia per surgeon order as noted in medical record  Staffing:  Performing  Anesthesiologist: JANIE CAMPOVERDE  Preanesthetic Checklist  Completed: patient identified, site marked, risks, benefits, and alternatives discussed, timeout performed, consent obtained, airway assessed, oxygen available, suction available, emergency drugs available and hand hygiene performed  Peripheral Block  Block type: other, tibial  Prep: ChloraPrep  Patient position: supine  Patient monitoring: cardiac monitor, continuous pulse oximetry, heart rate and blood pressure  Laterality: right  Injection technique: ultrasound guided    Ultrasound used to visualize needle placement in proximity to nerve being blocked: yes   Permanent ultrasound image captured for medical record    Needle  Needle type: Stimuplex   Needle gauge: 20G  Needle length: 6 in  no peripheral nerve catheter placed  Assessment  Injection assessment: negative aspiration for heme, no paresthesia on injection, incremental injection and no difficulty with injection

## 2021-06-10 NOTE — ANESTHESIA PROCEDURE NOTES
Spinal Block    Patient location during procedure: OR  Start time: 4/20/2017 8:47 AM  End time: 4/20/2017 8:52 AM  Reason for block: primary anesthetic    Staffing:  Performing  Anesthesiologist: JANIE CAMPOVERDE    Preanesthetic Checklist  Completed: patient identified, risks, benefits, and alternatives discussed, timeout performed, consent obtained, airway assessed, oxygen available, suction available, emergency drugs available and hand hygiene performed  Spinal Block  Patient position: sitting  Prep: Betadine  Patient monitoring: heart rate, continuous pulse ox and blood pressure  Approach: midline  Location: L2-3  Injection technique: single-shot  Needle type: pencil-tip   Needle gauge: 24 G

## 2021-06-10 NOTE — ANESTHESIA POSTPROCEDURE EVALUATION
Patient: Phi Christine  RIGHT TOTAL KNEE ARTHROPLASTY  Anesthesia type: spinal    Patient location: Phase II Recovery  Last vitals:   Vitals:    04/20/17 1134   BP: (!) 186/78   Pulse: 60   Resp: 18   Temp: 36.7  C (98  F)   SpO2: 98%     Post vital signs: stable  Level of consciousness: awake and responds to simple questions  Post-anesthesia pain: pain controlled  Post-anesthesia nausea and vomiting: no  Pulmonary: unassisted, return to baseline  Cardiovascular: stable and blood pressure at baseline  Hydration: adequate  Anesthetic events: no    QCDR Measures:  ASA# 11 - Rhonda-op Cardiac Arrest: ASA11B - Patient did NOT experience unanticipated cardiac arrest  ASA# 12 - Rhonda-op Mortality Rate: ASA12B - Patient did NOT die  ASA# 13 - PACU Re-Intubation Rate: NA - No ETT / LMA used for case  ASA# 10 - Composite Anes Safety: ASA10A - No serious adverse event  ASA# 38 - New Corneal Injury: ASA38A - No new exposure keratitis or corneal abrasion in PACU    Additional Notes:

## 2021-06-10 NOTE — ANESTHESIA PROCEDURE NOTES
Peripheral Block    Patient location during procedure: pre-op  Start time: 4/20/2017 7:44 AM  End time: 4/20/2017 7:50 AM  post-op analgesia per surgeon order as noted in medical record  Staffing:  Performing  Anesthesiologist: JANIE CAMPOVERDE  Preanesthetic Checklist  Completed: patient identified, site marked, risks, benefits, and alternatives discussed, timeout performed, consent obtained, airway assessed, oxygen available, suction available, emergency drugs available and hand hygiene performed  Peripheral Block  Block type: saphenous  Prep: ChloraPrep  Patient position: supine  Patient monitoring: cardiac monitor, continuous pulse oximetry, heart rate and blood pressure  Laterality: right  Injection technique: ultrasound guided    Ultrasound used to visualize needle placement in proximity to nerve being blocked: yes   Permanent ultrasound image captured for medical record    Needle  Needle type: Stimuplex   Needle gauge: 20G  Needle length: 6 in  no peripheral nerve catheter placed  Assessment  Injection assessment: negative aspiration for heme, no paresthesia on injection, incremental injection and no difficulty with injection

## 2021-06-15 PROBLEM — D62 ACUTE BLOOD LOSS AS CAUSE OF POSTOPERATIVE ANEMIA: Status: ACTIVE | Noted: 2017-04-22

## 2021-06-15 PROBLEM — Z95.1 HISTORY OF CORONARY ARTERY BYPASS SURGERY: Status: ACTIVE | Noted: 2017-04-22

## 2021-06-15 PROBLEM — I25.10 CHRONIC CORONARY ARTERY DISEASE: Status: ACTIVE | Noted: 2017-04-22

## 2021-06-15 PROBLEM — E78.5 DYSLIPIDEMIA: Status: ACTIVE | Noted: 2017-04-22

## 2021-06-15 PROBLEM — M17.11 PRIMARY OSTEOARTHRITIS OF RIGHT KNEE: Status: ACTIVE | Noted: 2017-04-20

## 2021-06-15 NOTE — PATIENT INSTRUCTIONS - HE
- Please call us if your compression wraps fall more than 1-2 inches below the bend of the knee. Remove the wraps if you experience any shortness of breathe or notice your toes turning blue/purple and then give us a call. Call if they are too painful. Call if they get wet. If it is a weekend and the wraps fall down, are too painful, or get wet take the wraps off and put on another form of compression. Compression such as velcro wraps, compression stockings, short stretch bandages, or tubular compression. Apply one of these until you can be seen in clinic. Please call us if you have any questions [phone].    - Treatment:  Layered Compression Bandaging (2-layer)    What is it?  The layered compression bandaging has a layer of absorbent material that will soak up drainage.     Why we do it.   This is done to treat swelling, wounds, or both.  This will in turn help circulation and healing.    How to care for your bandages.  The wraps need to be kept dry. If  the wraps become wet, remove them and call the clinic to have another wrap applied.    What to expect.  It is common for the wraps to be uncomfortable at the beginning. The first two days are usually the hardest; then they will become more comfortable.       Elevating your legs will help the discomfort. Try to elevate your legs as much as possible.    If rest and elevation does not help your discomfort, call your provider.  If your provider is not available you can remove the wrap and leave a message for further instructions.    - Swelling and Compression Therapy    Swelling in the legs can be caused by many reasons. No matter what the reason, treatment usually includes some type of compression. This may be done with a support sock, dressing, ace wrap, or layered wraps.     It is important to treat the swelling for many reasons. If the swelling is not treated you may develop blisters that can lead to ulcerations. This is caused when extra fluid goes into tissue  causing damage and blocking blood flow to the tissue.     It is important that you wear your compression every day, including days that you will be seen in clinic.     Compression is often the most important part of treating leg wounds. Without controlling the swelling it is often not possible to heal wounds.     Going without compression for even brief periods of time can be damaging to your legs and your health.  Your compression should be put on first thing in the morning. Take the compression off at night only when instructed by your care provider to do so. Sometimes wearing compression 24 hours a day will be recommended.       If you are having difficulty wearing your compression it is important to notify your care provider so that other options may be reviewed.    Thank you for choosing [facility]. Please call us if you have any questions [phone].

## 2021-06-15 NOTE — TELEPHONE ENCOUNTER
Informed Yi that the ultrasound demonstrated right incompentence; scheduled f/u appt with Dr. Corrigan

## 2021-06-15 NOTE — PATIENT INSTRUCTIONS - HE
- Please call us if your compression wraps fall more than 1-2 inches below the bend of the knee. Call if they are too painful. Call if they get wet. If it is a weekend and the wraps fall down, are too painful, or get wet take the wraps off and put on another form of compression. Compression such as velcro wraps, compression stockings, short stretch bandages, or tubular compression. Apply one of these until you can be seen in clinic. Please call us if you have any questions [phone].    - Treatment:  Layered Compression Bandaging (2-layer)    What is it?  The layered compression bandaging has a layer of absorbent material that will soak up drainage.     Why we do it.   This is done to treat swelling, wounds, or both.  This will in turn help circulation and healing.    How to care for your bandages.  The wraps need to be kept dry. If  the wraps become wet, remove them and call the clinic to have another wrap applied.    What to expect.  It is common for the wraps to be uncomfortable at the beginning. The first two days are usually the hardest; then they will become more comfortable.       Elevating your legs will help the discomfort. Try to elevate your legs as much as possible.    If rest and elevation does not help your discomfort, call your provider.  If your provider is not available you can remove the wrap and leave a message for further instructions.    - Swelling and Compression Therapy    Swelling in the legs can be caused by many reasons. No matter what the reason, treatment usually includes some type of compression. This may be done with a support sock, dressing, ace wrap, or layered wraps.     It is important to treat the swelling for many reasons. If the swelling is not treated you may develop blisters that can lead to ulcerations. This is caused when extra fluid goes into tissue causing damage and blocking blood flow to the tissue.     It is important that you wear your compression every day,  including days that you will be seen in clinic.     Compression is often the most important part of treating leg wounds. Without controlling the swelling it is often not possible to heal wounds.     Going without compression for even brief periods of time can be damaging to your legs and your health.  Your compression should be put on first thing in the morning. Take the compression off at night only when instructed by your care provider to do so. Sometimes wearing compression 24 hours a day will be recommended.       If you are having difficulty wearing your compression it is important to notify your care provider so that other options may be reviewed.    Thank you for choosing  [facility]. Please call us if you have any questions [phone].

## 2021-06-15 NOTE — PATIENT INSTRUCTIONS - HE
"Swelling in the legs can be caused by many reasons. No matter what the reason, treatment usually includes some type of compression. You should wear your compression socks as much as you can. Your compression should be put on first thing in the morning. Take the compression off at night. It is especially important to wear them with long periods of sitting/standing, long car rides or if you will be flying. Going without compression for even brief periods of time can be damaging to your legs and your health.  Compression socks should get refilled every 4-6 months. They do not need to be worn at night while in bed. We can refill your sock prescription for 1 year otherwise your primary is able to refill them for you. Call us with any problems or questions. If you do a lot of standing, it is good to do calf raises to help keep the blood pumping. If you sit a lot at work, it is good to get up periodically to walk around. Elevation of the foot of your bed 4-6\" helps the blood return back to where it is needed.     Please call us if you have any questions [phone]    Thank you for choosing [facility].              "

## 2021-06-15 NOTE — PATIENT INSTRUCTIONS - HE
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 Corrigan to further discuss these results and next steps t  Swelling in the legs can be caused by many reasons. No matter what the reason, treatment usually includes some type of compression. You should wear your compression socks as much as you can. Your compression should be put on first thing in the morning. Take the compression off at night. It is especially important to wear them with long periods of sitting/standing, long car rides or if you will be flying. Going without compression for even brief periods of time can be damaging to your legs and your health.  Compression socks should get replaced every 4-6 months. They do not need to be worn at night while in bed. Call us with any problems or questions. If you do a lot of standing, it is good to do calf raises to help keep the blood pumping. If you sit a lot at work, it is good to get up periodically to walk around. Elevation of the foot of your bed 4-6\" helps the blood return back to where it is needed.       Continue to wear your compression stockings or velcro wraps every day; put them on first thing in the morning and remove at bedtime    Replace your compression stockings every 3-4 months; these garments will lose their elasticity and become ineffective    Replace velcro wraps every 1-2 years    Elevate your legs periodically throughout the day, 30-60 minutes 1-3 times per day    Apply lotion to your legs 1-2 times per day; some good name brands are Cetaphil, Sarna, Aveeno, VaniCream, CeraVe    Continue to walk and exercise    If you are taking a diuretic continue to do so at the direction of your primary care " provider    Make an appointment at the vascular clinic again if you have worsening swelling, need a prescription for new compression garments; and/or develop new wounds

## 2021-06-15 NOTE — PATIENT INSTRUCTIONS - HE
Thank you for choosing [facility] as partners in your care.  Please read the following information about your treatment.    Treatment:  Layered Compression Bandaging (Two  -layer)    What is it?  The layered compression bandaging has a layer of absorbent material that will soak up drainage.     Why we do it.   This is done to treat swelling, wounds, or both.  This will in turn help circulation and healing.    How to care for your bandages.  The wraps need to be kept dry. If  the wraps become wet, remove them and call the clinic to have another wrap applied.    What to expect.  It is common for the wraps to be uncomfortable at the beginning. The first two days are usually the hardest; then they will become more comfortable.       Elevating your legs will help the discomfort. Try to elevate your legs as much as possible.    If rest and elevation does not help your discomfort, call your provider.  If your provider is not available you can remove the wrap and leave a message for further instructions.      If you have any questions, please contact [facility] at [phone].

## 2021-06-15 NOTE — PATIENT INSTRUCTIONS - HE
- Please call us if your compression wraps fall more than 1-2 inches below the bend of the knee. Call if they are too painful. Call if they get wet. If it is a weekend and the wraps fall down, are too painful, or get wet take the wraps off and put on another form of compression. Compression such as velcro wraps, compression stockings, short stretch bandages, or tubular compression. Apply one of these until you can be seen in clinic. Please call us if you have any questions [phone].    - Treatment:  Layered Compression Bandaging (2-layer)    What is it?  The layered compression bandaging has a layer of absorbent material that will soak up drainage.     Why we do it.   This is done to treat swelling, wounds, or both.  This will in turn help circulation and healing.    How to care for your bandages.  The wraps need to be kept dry. If  the wraps become wet, remove them and call the clinic to have another wrap applied.    What to expect.  It is common for the wraps to be uncomfortable at the beginning. The first two days are usually the hardest; then they will become more comfortable.       Elevating your legs will help the discomfort. Try to elevate your legs as much as possible.    If rest and elevation does not help your discomfort, call your provider.  If your provider is not available you can remove the wrap and leave a message for further instructions.    - Swelling and Compression Therapy    Swelling in the legs can be caused by many reasons. No matter what the reason, treatment usually includes some type of compression. This may be done with a support sock, dressing, ace wrap, or layered wraps.     It is important to treat the swelling for many reasons. If the swelling is not treated you may develop blisters that can lead to ulcerations. This is caused when extra fluid goes into tissue causing damage and blocking blood flow to the tissue.     It is important that you wear your compression every day,  including days that you will be seen in clinic.     Compression is often the most important part of treating leg wounds. Without controlling the swelling it is often not possible to heal wounds.     Going without compression for even brief periods of time can be damaging to your legs and your health.  Your compression should be put on first thing in the morning. Take the compression off at night only when instructed by your care provider to do so. Sometimes wearing compression 24 hours a day will be recommended.       If you are having difficulty wearing your compression it is important to notify your care provider so that other options may be reviewed.    Thank you for choosing  [facility]. Please call us if you have any questions [phone].

## 2021-06-15 NOTE — PROGRESS NOTES
Compression Applied to Bilateral  2-Layer [place]: I Applied the inner foam layer with the foot dorsiflexed and started atthe base of the fifth metatarsal head. I left the bottom of the heel exposed, and proceed by winding the foam up the leg using minimal overlap to just below the fibular head. I then applied the compression layer with the foot dorsiflexed and startingat the base of the fifth metatarsal head. I applied at full stretch and proceeded up the leg using 50% overlap. The bottom of the heel is covered with the compression layer up to the end at the fibular head just below the back of the knee and levelwith the top edge of the foam layer.  I gently pressed and conformed the entire surface of the system to ensurethat the two layers are firmly bound together

## 2021-06-16 PROBLEM — K55.9: Status: ACTIVE | Noted: 2020-07-29

## 2021-06-16 PROBLEM — Z79.01 ANTICOAGULATION GOAL OF INR 2 TO 3: Status: ACTIVE | Noted: 2020-04-02

## 2021-06-16 PROBLEM — Z51.81 ANTICOAGULATION GOAL OF INR 2 TO 3: Status: ACTIVE | Noted: 2020-04-02

## 2021-06-16 PROBLEM — E78.5 HYPERLIPIDEMIA, UNSPECIFIED: Status: ACTIVE | Noted: 2020-07-29

## 2021-06-16 PROBLEM — Z87.19 PERSONAL HISTORY OF OTHER DISEASES OF THE DIGESTIVE SYSTEM: Status: ACTIVE | Noted: 2020-07-29

## 2021-06-16 PROBLEM — C61 ADENOCARCINOMA OF PROSTATE (H): Status: ACTIVE | Noted: 2021-02-08

## 2021-06-16 PROBLEM — Z79.01 ANTICOAGULATION MONITORING, INR RANGE 2-3: Status: ACTIVE | Noted: 2020-09-15

## 2021-06-16 PROBLEM — H40.9 GLAUCOMA: Status: ACTIVE | Noted: 2020-06-22

## 2021-06-16 PROBLEM — C61 MALIGNANT NEOPLASM OF PROSTATE (H): Status: ACTIVE | Noted: 2020-07-29

## 2021-06-16 PROBLEM — Z96.1 PRESENCE OF INTRAOCULAR LENS: Status: ACTIVE | Noted: 2020-07-29

## 2021-06-16 PROBLEM — I48.3 TYPICAL ATRIAL FLUTTER (H): Status: ACTIVE | Noted: 2020-01-16

## 2021-06-16 PROBLEM — R13.10 DYSPHAGIA, UNSPECIFIED: Status: ACTIVE | Noted: 2020-07-29

## 2021-06-16 PROBLEM — Z71.89 ACP (ADVANCE CARE PLANNING): Status: ACTIVE | Noted: 2020-01-16
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
## 2021-06-16 PROBLEM — K62.7 RADIATION PROCTITIS: Status: ACTIVE | Noted: 2017-06-20

## 2021-06-16 PROBLEM — L89.302 PRESSURE INJURY OF BUTTOCK, STAGE 2 (H): Status: ACTIVE | Noted: 2021-02-25

## 2021-06-16 PROBLEM — E43 UNSPECIFIED SEVERE PROTEIN-CALORIE MALNUTRITION (H): Status: ACTIVE | Noted: 2020-07-29

## 2021-06-16 PROBLEM — R06.00 DYSPNEA: Status: ACTIVE | Noted: 2020-07-17

## 2021-06-16 PROBLEM — J90 PLEURAL EFFUSION: Status: ACTIVE | Noted: 2020-10-06

## 2021-06-16 PROBLEM — N17.9 AKI (ACUTE KIDNEY INJURY) (H): Status: ACTIVE | Noted: 2021-02-08

## 2021-06-16 PROBLEM — N18.30 STAGE 3 CHRONIC KIDNEY DISEASE (H): Status: ACTIVE | Noted: 2020-07-29

## 2021-06-16 PROBLEM — K63.89 COLONIC MASS: Status: ACTIVE | Noted: 2020-01-17

## 2021-06-16 PROBLEM — E87.5 HYPERKALEMIA: Status: ACTIVE | Noted: 2020-04-17

## 2021-06-16 PROBLEM — M79.89 LEG SWELLING: Status: ACTIVE | Noted: 2020-12-23

## 2021-06-16 PROBLEM — Z96.653 PRESENCE OF ARTIFICIAL KNEE JOINT, BILATERAL: Status: ACTIVE | Noted: 2020-07-29

## 2021-06-16 PROBLEM — Z00.00 ROUTINE HISTORY AND PHYSICAL EXAMINATION OF ADULT: Status: ACTIVE | Noted: 2021-02-08

## 2021-06-16 PROBLEM — D12.5 ADENOMATOUS POLYP OF SIGMOID COLON: Status: ACTIVE | Noted: 2020-03-06

## 2021-06-16 PROBLEM — Z98.890 OTHER SPECIFIED POSTPROCEDURAL STATES: Status: ACTIVE | Noted: 2020-07-29

## 2021-06-16 PROBLEM — R29.810 FACIAL DROOP: Status: ACTIVE | Noted: 2021-02-08

## 2021-06-16 PROBLEM — Z93.1 G TUBE FEEDINGS (H): Status: ACTIVE | Noted: 2021-02-25

## 2021-06-16 PROBLEM — D50.0 IRON DEFICIENCY ANEMIA DUE TO CHRONIC BLOOD LOSS: Status: ACTIVE | Noted: 2020-09-26

## 2021-06-16 PROBLEM — M21.372 FOOT DROP, LEFT FOOT: Status: ACTIVE | Noted: 2019-05-12

## 2021-06-16 PROBLEM — G93.40 ENCEPHALOPATHY: Status: ACTIVE | Noted: 2020-07-17

## 2021-06-16 PROBLEM — S22.41XA: Status: ACTIVE | Noted: 2020-09-26

## 2021-06-16 PROBLEM — I21.9 MYOCARDIAL INFARCTION (H): Status: ACTIVE | Noted: 2021-02-08

## 2021-06-16 PROBLEM — Z95.1 PRESENCE OF AORTOCORONARY BYPASS GRAFT: Status: ACTIVE | Noted: 2020-07-29

## 2021-06-16 NOTE — TELEPHONE ENCOUNTER
Spoke with patient regarding vein ablation procedure next week. Advised to bring a . Explained it is ok to eat and drink prior to procedure with exception of blood thinner. Will hold coumadin 5 days preprocedure per Dr Mayers Verified patient's insurance. We will supply patient with a thigh high compression sock. We carry from size medium to extra large. If patient doesn't fit into them they will need to provide their own. Questions answered. Patient will call if any further questions.

## 2021-06-17 NOTE — PATIENT INSTRUCTIONS - HE
Patient Instructions by Shruthi Magdaleno PT at 12/24/2019  7:00 AM     Author: Shruthi aMgdaleno PT Service: -- Author Type: Physical Therapist    Filed: 12/24/2019  7:47 AM Encounter Date: 12/24/2019 Status: Signed    : Shruthi Magdaleno PT (Physical Therapist)       LOWER EXTREMITY LYMPHEDEMA EXERCISES

## 2021-06-17 NOTE — PATIENT INSTRUCTIONS - HE
"We would like to see you back in 4 months for a follow up. You should wear compression as much as you can. It is especially important to wear compression with long periods of sitting/standing, long car rides or if you will be flying. Compression socks should get refilled every 4-6 months.If you do a lot of standing it is good to do calf raises to help keep the blood pumping. If you sit a lot at work it is good to get up periodically to walk around. Elevation of the foot of your bed 4-6\" helps the blood return back to where it is needed. They do not need to be worn at night while in bed. We can refill your compression prescription for 1 year otherwise your primary is able to refill them for you.  Call us with any problems or questions. [phone]    [facility]    [facility]  [address]  [place] [address]  Phone: [phone]    [facility]   [address] ([facility] [place] [address]  Phone: [phone]    [facility]  [address]  [address]   [place] [address]   Phone: [phone]    [facility]  [address]  [place] [address]  Phone: [phone]    [facility]   [address]  [place] [address]  Phone: [phone]    [facility]  [address]  [place] [address]  Phone: [phone]    [facility]  [address]  [address]  [place] [address]  Phone: [phone]    [place]   [address] [place] [address]   Phone: [phone]    [facility]  [address]   [place] [address]   Phone: [phone]   Fax: [phone]    [facility]   [address]             [address]                 [address]     [place] [address]      [place] [address]         [place] [address]  Phone: [phone]      Phone: [phone]            Phone: [phone]  Fax: [phone]          Fax:[phone]                 Fax: [phone]                                     Jeimy Joseph  [phone]  [email or web address]               .                 .

## 2021-06-17 NOTE — PATIENT INSTRUCTIONS - HE
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  You can remove your stocking to shower in 24 hours but then reapply after.  Wear the stocking for two weeks after the procedure while you are up.    Activity  The day of the procedure make sure to walk around the house for a few minutes each hour until bedtime.  The day after the procedure you should advance activity as tolerated.  NO strenuous activities though for 2 weeks.  In general avoid prolonged standing in place and sitting with your legs down.  Both of these activities will cause blood to pool in your legs resulting in more swelling and discomfort.  Do not drive or operate heavy machinery for 24 hours after the procedure.  Do not take baths or use hot tubs or swimming pools until your incision site is healed.  Do not fly for the Next 3 weeks.    Post Procedure Ultrasound  You will need an ultrasound within 2-3 days following the closure procedure.  Please schedule an ultrasound if you have not already done so.    Post Procedure Signs and Symptoms  There can be a mild amount of bruising and numbness after this procedure.  This will typically take a few weeks to fade.  You may feel pain or tenderness in your inner thigh.  Mild pain medication such as Tylenol or Ibuprofen maybe helpful.  It is normal to have fluid leaking from the injection areas the day of the procedure and it may be blood tinged.    Call if you have  Fever greater than 100.8 degrees F.  Uncontrolled bleeding from the incision site.  Pain that is not relieved by rest or pain medication.  Shortness of breath    Follow -up  Please plan to see your surgeon in the office two weeks after your procedure  Call [phone] to schedule this appointment if one has not already been made    You will receive a phone call from our staff within 48 hours of  your procedure.  Please have these instruction near by so that any questions can be addressed at that time.  If you have any concerns before that time, please do not hesitate to call us al [phone] and ask to speak to a nurse.  We want you to have a smooth recovery.    For emergencies after 4:30pm Monday - Friday, holidays and weekends:  For Dr Deandre Corrigan call [facility] at [phone]  [facility]  [place] [phone]

## 2021-06-17 NOTE — PROGRESS NOTES
Venaseal    Pre-Procedure:  Admit  [x]Consent for Venaseal Ablation of the   [x]Right Saphenous Vein and/or branches  []Left Saphenous Vein and/or branches  Vitals  [x]Vital signs per routine    Nursing Orders  [x]Vein Mapping of  [x]R extremity  []L extremity  [x]Sterile Prep to extremity    Medications  []Diazepam (Valium) 5mg PO, May Repeat x 1 for anxiety, relaxtion.    Post-Procedure:  Admission  [x]Post Procedure care - call physician for status update  []Admit to inpatient - Please document reason for admission in chart    Interventions require inpatient services     High risk of deterioration due to comorbidities     High risk of deterioration due to nature of admitting diagnosis  Location:    Vitals  [x]Vital signs per routine    Nursing Orders  [x]Thigh High Compression Stocking 20-30mm or 30-40mm to  [x]R extremity  []L extremity  [x]Check insertion site for bleeding or hematoma.  If bleeding or hematoma occurs, apply pressure and notify MD    Discharge  [x]Discharge home if no complications arise.  [x]Give post Venaseal Ablation discharge instructions to patient.  [x]Follow up venous ultrasound 72-96 hours after procedure.  [x]Follow up appointment with MD at 2 weeks.  [x]Contact MD for further questions

## 2021-06-18 NOTE — PATIENT INSTRUCTIONS - HE
Patient Instructions by Jatinder Thompson RN at 3/16/2021  8:20 AM     Author: Jatinder Thompson RN Service: -- Author Type: Registered Nurse    Filed: 3/16/2021  8:47 AM Encounter Date: 3/16/2021 Status: Signed    : Jatinder Thompson RN (Registered Nurse)         Varicose Vein Pre-Procedure Instructions/Vein Ablation    You are scheduled for a varicose vein treatment on your legs. The following is some helpful information for you, in regards to your treatment.    **Important:  A  will be needed post procedure.  (Unable to use Taxi or Uber).     We will supply a thigh high compression stocking for you. Please bring your compression sock as we only have sizes medium to X-large.    Please be aware, it is not advised to fly within 3 weeks post procedure    Please wear comfortable clothing.  We recommend that you bring a change of under clothes; they may get stained by the cleansing solution.    Feel free to bring a personal music player or a CD to listen to during your procedure.    Take your routine medications as you normally would with exception to blood thinners. Aspirin is ok to continue.    If you take Warfarin, Xarelto, or Eliquis this will need to be HELD prior to procedure according to primary care provider/doctor or cardiology who prescribes this medication.    Please notify us if you take this medication.    It is ok to eat prior to this procedure.    Please allow 1- 2 hours for your appointment.    For any questions regarding your procedure please call   [phone] to speak with the nurse.    If you would like a Good [employer] Estimate for your upcoming service/procedure contact Cost of Care Estimates at [phone], advocates are available Monday through Friday 8am - 5pm.    VenaSeal Ablation Codes  53481 for first vein in either leg  18153 for the second vein in the same leg    Please have the following information available:  1. Patient name and date of birth  2. Insurance company, plan name, ID and  group numbers  3. Description of the service/procedure and the associated procedure code numbers, if available. If more than one (1) procedure code, indicate which will be the primary procedure code.   4. The facility where the service/procedure will be performed.  5. The name of the physician involved with the service/procedure.  6. Appointment date of service.  7. Telephone number to call with the information.  Varicose Veins    UNDERSTAND  Varicose veins may be a sign of something more severe-venous reflux disease.  Healthy leg veins have valves that keep blood flowing to the heart. Venous reflux disease develops when the valves stop working properly and allow blood to flow backward (i.e., reflux) and pool in the lower leg veins.     If venous reflux disease is left untreated, symptoms can worsen over time. Your doctor can help you understand if you have this condition.     Superficial venous reflux disease may cause the following signs and symptoms in your legs:  Varicose veins  Aching  Swelling  Cramping  Heaviness or tiredness  Itching  Restlessness  Open skin sores    Treat  Superficial venous reflux disease treatment aims to reduce or stop the backward flow of blood. The following may be prescribed to treat your superficial venous reflux disease. Your doctor can help you decide which treatment is best for you:       Compression stockings     Removing diseased vein     Closing diseased vein (through thermal or non-thermal treatment)     VenaSeal? Closure System  One non-thermal treatment option is the VenaSeal? Closure System, which improves blood flow and relieves symptoms by sealing-or closing-the diseased vein. The system delivers a small amount of a specially formulated medical adhesive to the diseased vein. The adhesive permanently seals the vein and blood is rerouted through nearby healthy veins.          Demonstrated Outcomes  The VenaSeal? closure system is a safe and effective treatment, providing  significant improvements in quality of life.1,2,3    In a [place] study , the VenaSeal? system and thermal radiofrequency ablation treatments had similar clinical results at 3 years; 94.4% closure for the VenaSeal? system and 91.9% for thermal energy.     Side effects were minor and infrequent.     The most common side effect was phlebitis (i.e., inflammation of a vein), and it typically occurred within the first 30 days after the procedure. Phlebitis is a commonly reported side effect in all vein treatments. Phlebitis occurred more frequently in VenaSeal? system-treated subjects than in RFA-treated subjects, though the difference was not statistically significant.     Please see the Potential risks section for more information.    FAQ  What can I expect of the VenaSeal? procedure?    Before the Procedure:  You will have an ultrasound imaging exam of the leg that is to be treated. This exam is important for assessing the diseased superficial vein and planning the procedure.    During the Procedure:  Your doctor can discuss the procedure with you. A brief summary of what to expect is below:  You may feel some minor pain or stinging with a needle stick to numb the site where the doctor will access your vein.  Once the area is numb, your doctor will insert the catheter (i.e., a small hollow tube) into your leg. You may feel some pressure from the placement of the catheter.  The catheter will be placed in specific areas along the diseased vein to deliver small amounts of the medical adhesive. You may feel some mild sensation of pulling. Ultrasound will be used during the procedure to guide and position the catheter.  After treatment, the catheter is removed and a small adhesive bandage placed over the puncture site.         After the Procedure:  You will be taken to the recovery area to rest.  Your doctor will recommend follow-up care as needed.    Commonly Asked Questions  When will my symptoms improve?  Symptoms are caused  by the diseased superficial vein. Thus, symptoms may improve as soon as the diseased vein is closed.  When can I return to normal activity?  The VenaSeal procedure is designed to reduce recovery time. Many patients return to normal activity immediately after the procedure. Your doctor can help you determine when you can return to normal activity.  Is the VenaSeal procedure painful?  Most patients feel little, if any, pain during the outpatient procedure.1  Is there bruising after the VenaSeal? procedure?  Most patients report [id number] bruising after the VenaSeal procedure.1  What happens to the VenaSeal? adhesive?  Only a very small amount of VenaSeal? adhesive is used to close the vein. Your body will naturally create scar tissue around the adhesive over time to keep the vessel permanently closed.  How does the VenaSeal? procedure differ from thermal energy procedures?  The VenaSeal? procedure uses an adhesive to close the superficial vein. Thermal energy procedures use heat to close the vein. The intense heat requires a large volume of numbing medicine, which is injected through many needle sticks. The injections may cause pain and bruising after the procedure.  Is the VenaSeal procedure covered by insurance?  As with any procedure, insurance coverage may vary. For more information, please contact your insurance provider.    The VenaSeal? procedure may not be right for everyone  Your doctor can help you decide if the VenaSeal? procedure is right for you. The VenaSeal? procedure is contraindicated for individuals with any of the following conditions:  Thrombophlebitis migraines (i.e., inflammation of a vein caused by a slow moving blood clot)  Acute superficial thrombophlebitis (i.e., inflammation of a vein caused by a blood clot)  Previous hypersensitivity reactions to the VenaSeal? adhesive or cyanoacrylates  Acute sepsis (i.e., whole-body inflammation caused by an immune response to an  infection)    Potential risks  The VenaSeal? procedure is minimally invasive and catheter-based. As such, it may involve the following risks. Your doctor can help you understand these risks.  Allergic reaction to the VenaSeal? adhesive  Arteriovenous fistula (i.e., an abnormal connection between an artery and a vein)  Bleeding from the access site  Deep vein thrombosis (i.e., blood clot in the deep vein system)  Edema (i.e., swelling) in the treated leg  Embolization (i.e., blockage of a vein or artery), including pulmonary embolism (i.e., blockage of an artery in the lungs)  Hematoma (i.e., the collection of blood outside of a vessel)  Hyperpigmentation (i.e., darkening of the skin)  Infection at the access site  Non-specific mild inflammation of the cutaneous and subcutaneous tissue  Pain  Paresthesia (i.e., a feeling of tingling, pricking, numbness or burning)  Phlebitis (i.e., inflammation of a vein)  Superficial thrombophlebitis (i.e., inflammation of a vein caused by a blood clot)  Urticaria (i.e., hives) or ulceration may occur at the site of injection  Vascular rupture and perforation  Visible scarring

## 2021-06-18 NOTE — PATIENT INSTRUCTIONS - HE
Patient Instructions by Nichole Diggs NP at 2/9/2021 11:00 AM     Author: Nichole Diggs NP Service: -- Author Type: Nurse Practitioner    Filed: 2/9/2021 11:45 AM Encounter Date: 2/9/2021 Status: Signed    : Nichole Diggs NP (Nurse Practitioner)       We will have you come to clinic 1-2 times per week  We will wash the legs  Lotion the legs  Apply 2 layer bilaterally to reduce the swelling down      We will get you scheduled for venous insufficiency to check the valves in the veins; we will call you with results    Once the swelling is down we will transition you into compression stockings    We would like you to purchase a pair of the [employer] brand stockings from [employer]. You can order them online or by calling the toll free number.  Your leg measurements are provided below and you will have to let the [employer] representative know these measurments to get the correct size for you .  Website: www.[employer]  Toll-free: [phone]  Hours: Mon-Thur 8:30am-8pm, Friday 8:30am-7pm, Sat 9am-4pm    These should be knee high, 20-30  mmHG, closed toed    Vasc Edema 2/9/2021   Right just above MTP 23   Right Ankle 25.5   Right Widest Calf 38.6   Left - just above MTP 22.4   Left Ankle 22.1   Left Widest Calf 33.7       Swelling in the legs can be caused by many reasons. No matter what the reason, treatment usually includes some type of compression. You should wear your compression socks as much as you can. Your compression should be put on first thing in the morning. Take the compression off at night. It is especially important to wear them with long periods of sitting/standing, long car rides or if you will be flying. Going without compression for even brief periods of time can be damaging to your legs and your health.  Compression socks should get replaced every 4-6 months. They do not need to be worn at night while in bed. Call us with any problems or questions. If you do a lot of  "standing, it is good to do calf raises to help keep the blood pumping. If you sit a lot at work, it is good to get up periodically to walk around. Elevation of the foot of your bed 4-6\" helps the blood return back to where it is needed.   Thank you for choosing [facility]. Please call us if you have any questions [phone].    Venous Insufficiency Ultrasound    Description  A venous insufficiency ultrasound is a relatively pain free exam. The vascular laboratory will contain a bed and just two or three pieces of equipment. You will be asked to remove pants or shorts and gowns will be provided. It usually takes about 30-60 minutes.  The technician will tuck a towel under your underpants in the groin. The gel is water-soluble and will not stain your skin or clothes.  Ultrasound gel, usually warmed for your comfort, will be placed on the inner side of your legs.    Through the gel, the technician will apply to your legs a small hand-held device that emits sound waves. The technician will be applying pressure to your legs at certain levels.   When the test is completed, the technician will remove excess gel from your legs.    Risks  There are typically no side effects or complications associated with a lower extremity venous insufficiency duplex ultrasound.  How to Prepare  Eat and take medications as usual.  There is no preparation required for a lower extremity venous insufficiency duplex ultrasound.  What Can I Expect After the Test?  The technician will send the ultrasound images to your vascular surgeon for evaluation. Typically, a report is available in 2-3 days. If anything critical is found, it is standard practice to notify the vascular surgeon immediately.  Reference: https://vascular.org/patient-resources/vascular-conditions/chronic-venous-insufficiency          Thank you for choosing [facility] as partners in your care.  Please read the following information about " your treatment.    Treatment:  Layered Compression Bandaging (Two-layer)    What is it?  The layered compression bandaging has a layer of absorbent material that will soak up drainage.     Why we do it.   This is done to treat swelling, wounds, or both.  This will in turn help circulation and healing.    How to care for your bandages.  The wraps need to be kept dry. If  the wraps become wet, remove them and call the clinic to have another wrap applied.    What to expect.  It is common for the wraps to be uncomfortable at the beginning. The first two days are usually the hardest; then they will become more comfortable.       Elevating your legs will help the discomfort. Try to elevate your legs as much as possible.    If rest and elevation does not help your discomfort, call your provider.  If your provider is not available you can remove the wrap and leave a message for further instructions.      If you have any questions, please contact [facility] [place] at [phone].

## 2021-06-21 NOTE — LETTER
Letter by Deandre Corrigan MD at      Author: Deandre Corrigan MD Service: -- Author Type: --    Filed:  Encounter Date: 3/29/2021 Status: (Other)         03/29/21      Phi Christine  [address]  [place] [address]    Dear Phi,    Below you will find the details for your upcoming appointment with [facility]:     -Tuesday 4/27/21 starting at 2:30PM for a Venaseal procedure with Dr. Deandre Corrigan    -Thursday 4/29/21 starting at 9AM for a follow up ultrasound    -Tuesday 5/25/21 starting at 9:20AM for a follow up office visit with Dr. Deandre Corrigan    We are located in the [facility] at: [address], [address], [place], [place], [address]  Please arrive 10-15 minutes early to your appointment. Please wear a mask. Be sure to bring in your insurance cards as well as a photo ID. If you are having any symptoms of a cough, fever, rash, loss of taste and smell, or shortness of breath we ask that you please call and reschedule your appointment.  If you have any questions or concerns, please call our office at: [phone]    Sincerely,   [facility]

## 2021-06-21 NOTE — LETTER
Letter by Deandre Corrigan MD at      Author: Deandre Corrigan MD Service: -- Author Type: --    Filed:  Encounter Date: 3/16/2021 Status: (Other)         Cristy Enriquez MD  [address]  [place] [address]                                  March 16, 2021    Patient: Phi Christine   MR Number: [id number]   YOB: 1935   Date of Visit: 3/16/2021     Dear Dr. Mina MD:    Thank you for referring Phi Christine to me for evaluation. Below are the relevant portions of my assessment and plan of care.    If you have questions, please do not hesitate to call me. I look forward to following Phi along with you.    Sincerely,        Deandre Corrigan MD          CC  No Recipients  Deandre Corrigan MD  3/16/2021  8:52 AM  Sign when Signing Visit      [facility] Vein Consult      Assessment:     1. varicose veins, bilateral   2. venous stasis ulcer, bilateral in the past, hemosiderin and venous htn bilateral  3. Insuffiencey of right greater saphenous vein,    Plan:     1. Treatment options of conservative therapy of stockings use, exercise, weight loss, elevating legs when possible.    2. Script for compression stockings 20-30 mm hg  3. Ultrasound to evaluate legs for incompetency of both deep and superficial system .   4. Surgical treatment   Endovenous closure ofright, greater saphenous vein, venaseal   Risks and benefits of surgical intervention including infection, burns, dvt, thrombophlebitis, not closing, recurrence, numbness and nerve injury and need for further intervention were all discused    5. Follow up: for procedure if approved.   6. Call for any questions concerns or issues    Subjective:      Phi Christine is a 85 y.o. male  who was referred by Cristy Enriquez MD  for evaluation of varicose veins. Symptoms include pain, aching, fatigue, burning, edema, dermatitis and ulcers  . Patient has history of leg selling, pain and vein issues that have progressed. Pain  and symptoms have affected daily living and work activities needing medications. Here for evaluation today. Stocking use with compression stockings of 20-30 mm hg or greater for greater then 3 months    Allergies:Metformin    Past Medical History:   Diagnosis Date   ? Accelerated hypertension    ? ACP (advance care planning) 1/16/2020   ? Actinic keratosis    ? Acute blood loss anemia    ? Acute blood loss as cause of postoperative anemia 4/22/2017   ? Acute kidney injury (H)    ? Adenocarcinoma of prostate (H)    ? Adenoma of sigmoid colon    ? Adenomatous polyp of sigmoid colon 3/6/2020   ? Age-related macular degeneration    ? KYLAH (acute kidney injury) (H) 2/8/2021   ? Anemia associated with acute blood loss    ? Anticoagulation goal of INR 2 to 3 4/2/2020   ? Anticoagulation monitoring, INR range 2-3 9/15/2020   ? ARMD (age related macular degeneration)    ? Aspirin intolerance 9/2/2016    Overview:  Bloody nose.   ? Atrial flutter (H)    ? Borderline glaucoma with ocular hypertension    ? CHF (congestive heart failure) (H)    ? Chronic coronary artery disease 4/22/2017   ? Chronic diastolic heart failure (H) 1/15/2014   ? Chronic kidney disease    ? Chronic renal insufficiency, stage III (moderate)    ? Closed fracture of three ribs of right side, initial encounter 9/26/2020   ? Colonic mass 1/17/2020   ? Coronary artery disease    ? Diabetes mellitus (H)     Type II   ? Diastolic heart failure (H)    ? Dyslipidemia    ? Dyspnea 7/17/2020   ? Encephalopathy 7/17/2020   ? Facial droop    ? Fall at home, initial encounter 9/24/2020   ? Foot drop, left foot    ? Glaucoma 6/22/2020   ? Gout    ? Hemothorax on right    ? High cholesterol    ? History of coronary artery bypass surgery 4/22/2017   ? Hyperkalemia 4/17/2020   ? Hypertension    ? Iron deficiency anemia due to chronic blood loss 9/26/2020   ? Lower GI bleed    ? Myocardial infarction (H)     16 years ago   ? Obesity    ? Obesity (BMI 30-39.9) 7/12/2013    ? Pleural effusion 10/6/2020   ? Primary osteoarthritis of right knee 4/20/2017   ? Pseudophakia of both eyes    ? Pseudophakia, both eyes 8/12/2014   ? Radiation proctitis    ? Rectal bleeding    ? Renal insufficiency 7/12/2013   ? Routine history and physical examination of adult 2/8/2021    Overview:  65+yo Male Routine Health Maintenance (ICSI 2007 Guidelines) Hypertension and Obesity: see chart review   Depression: no Alcohol:  Aspirin prophylaxis: yes Osteoporosis risks and prevention assessed/discussed:  Vision:  Hearing:  Tobacco use/cessation (if indicated): no  Lipids: 1/9/1009 Colon CA:  AAA (if age 65-74 and have ever smoked 100+ cigarettes in lifetime):     Vaccines (Td/TDa   ? Type 2 diabetes mellitus (H) 10/20/2009    Overview:  a system change updated this record. This will not affect patient care or billing. This comment can be deleted.    ? Typical atrial flutter (H) 1/16/2020       Past Surgical History:   Procedure Laterality Date   ? CARDIAC SURGERY     ? COLONOSCOPY      x 2   ? CORONARY ARTERY BYPASS GRAFT  2001    CABG x4    ? EYE SURGERY      Cataract Removal Left    ? flexible sigmoidscopy      ? INTRAOCULAR LENS INSERTION      Kelman phacoemulsification clear corneal intraocular lens implant - Right    ? IR PLEURAL DRAINAGE W CATHETER INSERTION  10/6/2020   ? JOINT REPLACEMENT Left     knee   ? UT COLONOSCOPY FLX DX W/COLLJ SPEC WHEN PFRMD N/A 1/28/2020    Procedure: COLONOSCOPY WITH TATTOO;  Surgeon: Raul Taylor MD;  Location: [facility];  Service: Gastroenterology   ? UT TOTAL KNEE ARTHROPLASTY Right 4/20/2017    Procedure: RIGHT TOTAL KNEE ARTHROPLASTY;  Surgeon: Kendrick Ramirez MD;  Location: [facility] OR;  Service: Orthopedics       Current Outpatient Medications   Medication Sig   ? acetaminophen-codeine (TYLENOL #3) 300-30 mg per tablet Take 1-2 tablets by mouth.   ? allopurinol (ZYLOPRIM) 100 MG tablet Take 100 mg by mouth daily.   ? aspirin 81 mg chewable  tablet Chew 81 mg at bedtime.   ? atorvastatin (LIPITOR) 40 MG tablet Take 40 mg by mouth at bedtime.   ? bimatoprost (LUMIGAN) 0.01 % Drop Apply 1 drop to eye.   ? carvediloL (COREG) 12.5 MG tablet Take 18.75 mg by mouth 2 (two) times a day with meals. 1 and 1/2 tablets   ? ferrous sulfate 324 mg (65 mg iron) TbEC Take 324 mg by mouth.   ? glimepiride (AMARYL) 2 MG tablet Take 2 mg by mouth every morning before breakfast.   ? hydrALAZINE (APRESOLINE) 10 MG tablet Take 10 mg by mouth 3 (three) times a day.   ? latanoprost (XALATAN) 0.005 % ophthalmic solution Apply 1 drop to eye.   ? mecobalamin, vitamin B12, 1,000 mcg TbDL Place 1 tablet (1,000 mcg total) under the tongue daily.   ? melatonin 5 mg Tab tablet Take 5 mg by mouth at bedtime.   ? nitroglycerin (NITROSTAT) 0.4 MG SL tablet Place 0.4 mg under the tongue every 5 (five) minutes as needed for chest pain.   ? omeprazole (PRILOSEC OTC) 20 MG tablet Take 1 tablet (20 mg total) by mouth daily before breakfast.   ? omeprazole (PRILOSEC) 20 MG capsule TAKE 1 CAPSULE BY MOUTH BEFORE BREAKFAST   ? psyllium (METAMUCIL) 3.4 gram packet Take 1 packet by mouth daily.   ? spironolactone (ALDACTONE) 25 MG tablet Take 12.5 mg by mouth.   ? travoprost (TRAVATAN Z) 0.004 % ophthalmic drops Administer 1 drop to both eyes at bedtime.   ? travoprost, benzalkonium, (TRAVATAN) 0.004 % ophthalmic solution Apply 1 drop to eye.   ? vit C/E/Zn/coppr/lutein/zeaxan (PRESERVISION AREDS-2 ORAL) Take 1 capsule by mouth 2 (two) times a day.   ? warfarin ANTICOAGULANT (COUMADIN/JANTOVEN) 1 MG tablet Take 1.5-2 tablets (1.5-2 mg total) by mouth See Admin Instructions. 2 mg on Tues and Friday. 1.5 mg on the other 5 days of the week.  Takes in the evening.  Adjust dose based on INR results as directed. (Patient taking differently: Take 1-1.5 mg by mouth See Admin Instructions. 1 mg on TUES  1.5 mg all other days)       Family History   Problem Relation Age of Onset   ? Cancer Mother          Colon Cancer   ? Stroke Father    ? Gout Father    ? Diabetes Brother    ? Heart disease Brother         reports that he quit smoking about 53 years ago. He has quit using smokeless tobacco. He reports current alcohol use. He reports that he does not use drugs.      Review of Systems:  Pertinent items are noted in HPI.  A 12 point comprehensive review of systems was negative except as noted.   Patient has symptomatic veins and changes of bilateral legs. These have progressed to the point of causing symptoms on a daily basis. This causes issues with daily activities and chores such as mowing lawn, outdoor upkeep and standing for long lengths of time       Objective:     Vitals:    03/16/21 0817   BP: 138/84   Patient Site: Left Arm   Patient Position: Sitting   Cuff Size: Adult Regular   Pulse: 84   Resp: 28   Temp: 97.5  F (36.4  C)   TempSrc: Temporal   SpO2: 96%   Weight: 211 lb (95.7 kg)     Body mass index is 32.08 kg/m .    EXAM:  GENERAL: This is a well-developed 85 y.o. male who appears his stated age  HEAD: normocephalic  HEENT: Pupils equal and reactive bilaterally  MOUTH: mucus membranes intact. Normal dentation  CARDIAC: RRR without murmur  CHEST/LUNG:  Clear to auscultation bilaterally  ABDOMEN: Soft, nontender, nondistended, no masses noted   NEUROLOGIC: Focally intact, nonfocal, alert and oriented x 3  INTEGUMENT: No open lesions or ulcers  VASCULAR: Pulses intact, symmetrical upper and lower extremities. There areskin changes consistent with chronic venous insufficiency. Varicose veins present in bilateral greater saphenous distribution. Spider veins present bilateral.                    Imaging:    US Venous Insufficiency Legs Bilateral (Order [id number])  Imaging  Date: 2/11/2021 Department: [facility] [place] Released By: Gloria Shen Authorizing: Nichole Diggs NP   Study Result    BILATERAL Venous Insufficiency Ultrasound (02/11/21)    BILATERAL Lower  Extremity          Indication:  SURVEILLANCE BILATERAL LEG VARICOSE VEINS, PAIN AND SWELLING.      Previous: NONE     Patient History: Swelling     Presenting Symptoms:  Swelling, Varicose Veins and Pain     Technique:   Supine and Upright Ultrasound of the Deep and Superficial Veins with Valsalva and Compression Augmentation Maneuvers. Duplex Imaging is performed utilizing gray-scale, Two-dimensional images, color-flow imaging, Doppler waveform analysis, and Spectral doppler imaging done with provacative maneuvers.      Incompetency Criteria:  Deep vein reflux reported when greater than 1000 msec flow reversal. Superficial vein reflux reported when equal to or greater than 600 msec flow reversal.  vein reflux reported as greater than 350 msec flow reversal.      Right  Leg Deep Veins    CFV SFJ PFV PROX SFV   PROX SFV MID SFV DIST POP V. PERON.   V. PTV'S   Compressibility  (FC,PC,NC) FC FC FC FC FC FC FC FC FC   Reflux -   - - - - -   -         Right Leg Saphenous Veins  Location SFJ PROX THIGH KNEE MID CALF SPJ PROX CALF MID CALF   RT GSV (mm) 8.7 6.5 6.3 4.1         Reflux + + + +         RT SSV (mm)         4.6 4.9 2.8   Reflux         - - -         Left Leg Deep Veins    CFV SFJ PFV PROX SFV PROX SFV MID SFV DIST POP V. PERON. V. PTV'S   Compressibility  (FC,PC,NC) FC FC FC FC FC FC FC FC FC   Reflux -   - - - - -   -         Lt Leg Saphenous Veins   Location SFJ PROX THIGH KNEE MID CALF SPJ PROX CALF MID CALF   LT GSV (mm) 4.8 5.1 2.6 2.2         Reflux - - - -         LT SSV (mm)         5.1 5.4 5.3   Reflux         - - -         Compression Study:  Normal     Comments:      Impression:       Right Deep Vein Findings: No evidence of DVT, no evidence of deep system reflux on the right side     Left Deep Vein Findings: No evidence of DVT, no evidence of deep system reflux on the left side     Superficial Vein Findings:      Right Greater Saphenous vein: Positive for reflux for the right GSV starting  from the saphenofemoral junction all the way to the mid calf     Right Small Saphenous Vein: No evidence of reflux, vein is completely competent     Left Greater Saphenous Vein: No evidence of reflux vein is completely competent     Left Small Saphenous Vein: No evidence of reflux vein is competent     Perforating and Accessory Veins: No reflux     Reference:     Compressibility: FC= Fully compressible, PC= Partially compressible, NC= Non-compressible, NV= Not Visualized     Reflux: (+) Incompetent  (-) Competent, (NV) = Not Visualized     Interpretation criteria:          Duration of Retrograde flow (milliseconds)  Category Deep Veins Superficial Veins  veins   Competent < 1000ms < 600ms < 350ms   Incompetent > 1000ms > 600ms > 350ms            Deandre Corrigan MD  General Surgery [phone]  Vascular Surgery [phone]

## 2021-06-25 NOTE — PROGRESS NOTES
Post Procedure Note Endovenous Closure    S: Phi Christine is a 85 y.o. male S/P Right  leg endovenous closure ofRight lower ext. Two weeks out from procedure. Doing well, wore male  thigh high socks. Minimal discomfort. Some superficial phlibitis. Which is resolving.     O:   Vitals:    05/25/21 0925   BP: 144/68   Patient Site: Right Arm   Patient Position: Sitting   Cuff Size: Adult Regular   Pulse: 72   Resp: 20   Temp: 98.9  F (37.2  C)   TempSrc: Oral       General: no apparent distress  Legs look good no signs of infection, incisions healing nicely.        Imaging:    US Venous Post Ablation Leg Right (Order [id number])  Imaging  Date: 4/29/2021 Department: [facility] Released By: Santa Ortega Aide Authorizing: Deandre Corrigan MD   Study Result    Right Venous Ultrasound Status Post Chemical Ablation (04/29/21)        Indication: Follow-up saphenous vein Chemical ablation     Date of Procedure: Right 4/27/2021        Right CFV/SFJ Compression:  Fully Compressible     Reference:   (FC)-Fully Compressible           (PC)-Partially Compressible   (NC) Non-Compressible     Location Right GSV   Proximal Thigh NC   Mid Thigh NC   Distal Thigh NC   Knee NC   Proximal Calf NC   Mid Calf NC   Distal Calf NC         Comments:      Impression: Status post right great saphenous vein chemical ablation, the right GSV is noncompressible from the proximal thigh all the way down to the distal calf.  No evidence of DVT.         A/P: S/p endovenous closure. For insuffiencey of veins     May switch to knee high support socks   Resume all activities   RTC 4 months   Call for any questions or concerns     Deandre Corrigan MD   [facility]

## 2021-06-30 NOTE — PROGRESS NOTES
Progress Notes by Nicole Andrade LPN at 2/26/2021  9:00 AM     Author: Nicole Andrade LPN Service: -- Author Type: Licensed Nurse    Filed: 2/26/2021  9:36 AM Encounter Date: 2/26/2021 Status: Signed    : Nicole Andrade LPN (Licensed Nurse)       Nurse Visit        Chief Complaint: Patient presents to clinic for assessment and treatment of  bilaterally leg swelling    Dressing on Arrival: 2 layer intact and clean. Swollen has decreased bilateral leg. No open on the legs; skin intact and clean    Patient came in today for dressing change and 2 layer rewrap per order from NP, Nichole Diggs. Patient rated pain 0 out of 10. Removed dressings and cleansed skin with soap. Applied lotion to intact skin. Rewrapped 2 layer compression wrap to bilaterally leg. Patient tolerated dressing change well.       Allergies:   Allergies   Allergen Reactions   ? Metformin Dizziness       Medications:   Current Outpatient Medications:   ?  acetaminophen-codeine (TYLENOL #3) 300-30 mg per tablet, Take 1-2 tablets by mouth., Disp: , Rfl:   ?  allopurinol (ZYLOPRIM) 100 MG tablet, Take 100 mg by mouth daily., Disp: , Rfl:   ?  aspirin 81 mg chewable tablet, Chew 81 mg at bedtime., Disp: , Rfl:   ?  atorvastatin (LIPITOR) 40 MG tablet, Take 40 mg by mouth at bedtime., Disp: , Rfl:   ?  bimatoprost (LUMIGAN) 0.01 % Drop, Apply 1 drop to eye., Disp: , Rfl:   ?  carvediloL (COREG) 12.5 MG tablet, Take 18.75 mg by mouth 2 (two) times a day with meals. 1 and 1/2 tablets, Disp: , Rfl:   ?  ferrous sulfate 324 mg (65 mg iron) TbEC, Take 324 mg by mouth., Disp: , Rfl:   ?  glimepiride (AMARYL) 2 MG tablet, Take 2 mg by mouth every morning before breakfast., Disp: , Rfl:   ?  hydrALAZINE (APRESOLINE) 10 MG tablet, Take 10 mg by mouth 3 (three) times a day., Disp: , Rfl:   ?  latanoprost (XALATAN) 0.005 % ophthalmic solution, Apply 1 drop to eye., Disp: , Rfl:   ?  mecobalamin, vitamin B12, 1,000 mcg TbDL, Place 1 tablet (1,000 mcg total)  under the tongue daily., Disp: 30 tablet, Rfl: 1  ?  melatonin 5 mg Tab tablet, Take 5 mg by mouth at bedtime., Disp: , Rfl:   ?  nitroglycerin (NITROSTAT) 0.4 MG SL tablet, Place 0.4 mg under the tongue every 5 (five) minutes as needed for chest pain., Disp: , Rfl:   ?  omeprazole (PRILOSEC OTC) 20 MG tablet, Take 1 tablet (20 mg total) by mouth daily before breakfast., Disp: 60 tablet, Rfl: 1  ?  omeprazole (PRILOSEC) 20 MG capsule, TAKE 1 CAPSULE BY MOUTH BEFORE BREAKFAST, Disp: , Rfl:   ?  psyllium (METAMUCIL) 3.4 gram packet, Take 1 packet by mouth daily., Disp: , Rfl:   ?  spironolactone (ALDACTONE) 25 MG tablet, Take 12.5 mg by mouth., Disp: , Rfl:   ?  travoprost (TRAVATAN Z) 0.004 % ophthalmic drops, Administer 1 drop to both eyes at bedtime., Disp: , Rfl:   ?  travoprost, benzalkonium, (TRAVATAN) 0.004 % ophthalmic solution, Apply 1 drop to eye., Disp: , Rfl:   ?  vit C/E/Zn/coppr/lutein/zeaxan (PRESERVISION AREDS-2 ORAL), Take 1 capsule by mouth 2 (two) times a day., Disp: , Rfl:   ?  warfarin ANTICOAGULANT (COUMADIN/JANTOVEN) 1 MG tablet, Take 1.5-2 tablets (1.5-2 mg total) by mouth See Admin Instructions. 2 mg on Tues and Friday. 1.5 mg on the other 5 days of the week.  Takes in the evening.  Adjust dose based on INR results as directed. (Patient taking differently: Take 1-1.5 mg by mouth See Admin Instructions. 1 mg on TUES 1.5 mg all other days), Disp: , Rfl: 0    Vital Signs: /72 (Patient Site: Left Arm, Patient Position: Sitting, Cuff Size: Adult Regular)   Pulse 72   Temp 98.4  F (36.9  C) (Temporal)   Resp 28       Assessment:    General:  Patient presents to clinic in no apparent distress.  Psychiatric:  Alert and oriented .   Lower extremity:  edema is not present.    Integumentary:  Skin is uniformly warm, dry and pink.    Wound size:     Undermining is not present.    The periwoundskin is WNL      Plan:         1. Patient will follow up on Tuesday with provider           2. Treatment  provided will include irrigation and dressings to promote autolytic debridement and will be as listed below     Cleansed with: soap and warm water    Protected skin with: Remedy Skin Repair    Dressings Applied: NA    Compression Applied to the Left Le-Layer Coban      2-Layer Coban:Applied the inner foam layer with the foot dorsiflexed and starting atthe base of the fifth metatarsal head. Leaving the bottom of the heel exposed, proceed by winding the foam up the leg using minimaloverlap to just below the fibular head. Apply the compression layer with the foot dorsiflexed and startingat the base of the fifth metatarsal head. Apply at full stretch and proceed up the leg using 50% overlap. The bottom of the heel is covered with the compression layer. The end at the fibular head or just below the back of the knee and levelwith the top edge of the foam layer.  Gently pressed and conformed the entire surface of the system to ensurethat the two layers are firmly bound together    Compression Applied to the Right Le-Layer Coban    2-Layer Coban:Applied the inner foam layer with the foot dorsiflexed and starting atthe base of the fifth metatarsal head. Leaving the bottom of the heel exposed, proceed by winding the foam up the leg using minimaloverlap to just below the fibular head. Apply the compression layer with the foot dorsiflexed and startingat the base of the fifth metatarsal head. Apply at full stretch and proceed up the leg using 50% overlap. The bottom of the heel is covered with the compression layer. The end at the fibular head or just below the back of the knee and levelwith the top edge of the foam layer.  Gently pressed and conformed the entire surface of the system to ensurethat the two layers are firmly bound together    Offloading applied: None  Trial Products: no  Provider notified regarding concerns: no  Treatment Changes: no    Educational Barriers: none  Taught Regarding: activities and  compression   Teaching Method: Explanation and DC sheet

## 2021-06-30 NOTE — PROGRESS NOTES
Progress Notes by Nicole Andrade LPN at 3/8/2021  9:00 AM     Author: Nicole Andrade LPN Service: -- Author Type: Licensed Nurse    Filed: 3/8/2021 10:46 AM Encounter Date: 3/8/2021 Status: Signed    : Nicole Andrade LPN (Licensed Nurse)       Nurse Visit        Chief Complaint: Patient presents to clinic for assessment and treatment of their bilateral leg swelling    Dressing on Arrival: 2 layer wrap intact and clean. Skin is intact and clean. swollen has decreased    Patient came in today for 2 layer rewrap per order from NP, Nichole Diggs. Patient rated pain 0 out of 10. Removed dressings and cleansed skin with soap. Applied lotion to intact skin. Patient was transitioned into compression socks. Writer demonstrated to patient and spouse ; easy way to put on compression. Instruction sent with patient .      Allergies:   Allergies   Allergen Reactions   ? Metformin Dizziness       Medications:   Current Outpatient Medications:   ?  acetaminophen-codeine (TYLENOL #3) 300-30 mg per tablet, Take 1-2 tablets by mouth., Disp: , Rfl:   ?  allopurinol (ZYLOPRIM) 100 MG tablet, Take 100 mg by mouth daily., Disp: , Rfl:   ?  aspirin 81 mg chewable tablet, Chew 81 mg at bedtime., Disp: , Rfl:   ?  atorvastatin (LIPITOR) 40 MG tablet, Take 40 mg by mouth at bedtime., Disp: , Rfl:   ?  bimatoprost (LUMIGAN) 0.01 % Drop, Apply 1 drop to eye., Disp: , Rfl:   ?  carvediloL (COREG) 12.5 MG tablet, Take 18.75 mg by mouth 2 (two) times a day with meals. 1 and 1/2 tablets, Disp: , Rfl:   ?  ferrous sulfate 324 mg (65 mg iron) TbEC, Take 324 mg by mouth., Disp: , Rfl:   ?  glimepiride (AMARYL) 2 MG tablet, Take 2 mg by mouth every morning before breakfast., Disp: , Rfl:   ?  hydrALAZINE (APRESOLINE) 10 MG tablet, Take 10 mg by mouth 3 (three) times a day., Disp: , Rfl:   ?  latanoprost (XALATAN) 0.005 % ophthalmic solution, Apply 1 drop to eye., Disp: , Rfl:   ?  mecobalamin, vitamin B12, 1,000 mcg TbDL, Place 1 tablet  (1,000 mcg total) under the tongue daily., Disp: 30 tablet, Rfl: 1  ?  melatonin 5 mg Tab tablet, Take 5 mg by mouth at bedtime., Disp: , Rfl:   ?  nitroglycerin (NITROSTAT) 0.4 MG SL tablet, Place 0.4 mg under the tongue every 5 (five) minutes as needed for chest pain., Disp: , Rfl:   ?  omeprazole (PRILOSEC OTC) 20 MG tablet, Take 1 tablet (20 mg total) by mouth daily before breakfast., Disp: 60 tablet, Rfl: 1  ?  omeprazole (PRILOSEC) 20 MG capsule, TAKE 1 CAPSULE BY MOUTH BEFORE BREAKFAST, Disp: , Rfl:   ?  psyllium (METAMUCIL) 3.4 gram packet, Take 1 packet by mouth daily., Disp: , Rfl:   ?  spironolactone (ALDACTONE) 25 MG tablet, Take 12.5 mg by mouth., Disp: , Rfl:   ?  travoprost (TRAVATAN Z) 0.004 % ophthalmic drops, Administer 1 drop to both eyes at bedtime., Disp: , Rfl:   ?  travoprost, benzalkonium, (TRAVATAN) 0.004 % ophthalmic solution, Apply 1 drop to eye., Disp: , Rfl:   ?  vit C/E/Zn/coppr/lutein/zeaxan (PRESERVISION AREDS-2 ORAL), Take 1 capsule by mouth 2 (two) times a day., Disp: , Rfl:   ?  warfarin ANTICOAGULANT (COUMADIN/JANTOVEN) 1 MG tablet, Take 1.5-2 tablets (1.5-2 mg total) by mouth See Admin Instructions. 2 mg on Tues and Friday. 1.5 mg on the other 5 days of the week.  Takes in the evening.  Adjust dose based on INR results as directed. (Patient taking differently: Take 1-1.5 mg by mouth See Admin Instructions. 1 mg on TUES 1.5 mg all other days), Disp: , Rfl: 0    Vital Signs: /64 (Patient Site: Left Arm, Patient Position: Sitting, Cuff Size: Adult Regular)   Pulse 64   Temp 97.9  F (36.6  C) (Temporal)   Resp 24       Assessment:    General:  Patient presents to clinic in no apparent distress.  Psychiatric:  Alert and oriented .   Lower extremity:  edema is not present.    Integumentary:  Skin is uniformly warm, dry and pink.    Wound size:     Undermining NA.    The periwoundskin is WNL        Plan:         1. Patient will follow up on March 16 with provider         2.  Treatment provided will include irrigation and dressings to promote autolytic debridement and will be as listed below     Cleansed with: no wound    Protected skin with: sween moisturizer    Dressings Applied: NA    Compression Applied to the Left Leg: Compression socks      compression socks    Compression Applied to the Right Leg: Compression socks    Compression Stockings    Offloading applied: cane  Trial Products: no  Provider notified regarding concerns: no  Treatment Changes: no    Educational Barriers: none  Taught Regarding: Activity, Compression and Dressing  Teaching Method: Explanation and DC sheet

## 2021-06-30 NOTE — PROGRESS NOTES
Progress Notes by Nichole Diggs NP at 2/9/2021 11:00 AM     Author: Nichole Diggs NP Service: -- Author Type: Nurse Practitioner    Filed: 2/9/2021  1:19 PM Encounter Date: 2/9/2021 Status: Signed    : Nichole Diggs NP (Nurse Practitioner)               [facility] Consult Note    Date of Service:  2/9/2021    Requesting Provider: Dr. Camelia Cronin; dermatology    Chief Complaint: BLE swelling; right leg redness    History of Present Illness: Phi Christine is being seen at [facility] today regarding BLE swelling and right leg redenss. They arrive to the clinic today with wife Rosario. The patient reports that the swelling has been going on for sometime now; could no be more specific. He has history of CHF; renal insufficiency; bilateral knee replacements. He was recently started on norvasc and noted worsening swelling so this was stopped they noted improvement right away, . Was currently/previously using some type of ointment; they believe this was prescription strength antibiotic ointment; has also tried bleach soaks. Reports pain of 0/10; currently using nothing for pain. Has used aces as compression in the past, is currently using ace for compression. Denies any fevers, chills, or generalized ill feeling.+ history of cancer prostate; treated with radiation; just given the all clear. Sleeps in a bed with legs elevated; they swelling is improved by morning. He lives with his wife in [facility].  Denies history of DVT, cellulitis and vein procedures. Positive history of joint replacement.I personally reviewed outside imaging, lab work, and progress noted through Care Everywhere and outside records. 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 He has diabetes checks fs daily  running under 150.       Review of Systems:   Constitutional:  negative  for fever, chills or night sweats  EENTM: positive for glasses;  positive [place]  GI:  negative for nausea/vomiting;  negative for constipation  negative diarrhea;  negative for fecal incontinence negative weight loss  :  negative dysuria, negative incontinence  MS: patient is ambulatory;  does use assistive devices  Cardiac:  positive chf; aflutter; on anticoagulation  Respiratory:  negative SOB  Endocrine:  positive diabetes  Psych:  negative depression/anxiety    Past Medical History:    Past Medical History:   Diagnosis Date   ? Accelerated hypertension    ? ACP (advance care planning) 1/16/2020   ? Actinic keratosis    ? Acute blood loss anemia    ? Acute blood loss as cause of postoperative anemia 4/22/2017   ? Acute kidney injury (H)    ? Adenocarcinoma of prostate (H)    ? Adenoma of sigmoid colon    ? Adenomatous polyp of sigmoid colon 3/6/2020   ? Age-related macular degeneration    ? KYLAH (acute kidney injury) (H) 2/8/2021   ? Anemia associated with acute blood loss    ? Anticoagulation goal of INR 2 to 3 4/2/2020   ? Anticoagulation monitoring, INR range 2-3 9/15/2020   ? ARMD (age related macular degeneration)    ? Aspirin intolerance 9/2/2016    Overview:  Bloody nose.   ? Atrial flutter (H)    ? Borderline glaucoma with ocular hypertension    ? CHF (congestive heart failure) (H)    ? Chronic coronary artery disease 4/22/2017   ? Chronic diastolic heart failure (H) 1/15/2014   ? Chronic kidney disease    ? Chronic renal insufficiency, stage III (moderate)    ? Closed fracture of three ribs of right side, initial encounter 9/26/2020   ? Colonic mass 1/17/2020   ? Coronary artery disease    ? Diabetes mellitus (H)     Type II   ? Diastolic heart failure (H)    ? Dyslipidemia    ? Dyspnea 7/17/2020   ? Encephalopathy 7/17/2020   ? Facial droop    ? Fall at home, initial encounter 9/24/2020   ? Foot drop, left foot    ? Glaucoma  6/22/2020   ? Gout    ? Hemothorax on right    ? High cholesterol    ? History of coronary artery bypass surgery 4/22/2017   ? Hyperkalemia 4/17/2020   ? Hypertension    ? Iron deficiency anemia due to chronic blood loss 9/26/2020   ? Lower GI bleed    ? Myocardial infarction (H)     16 years ago   ? Obesity    ? Obesity (BMI 30-39.9) 7/12/2013   ? Pleural effusion 10/6/2020   ? Primary osteoarthritis of right knee 4/20/2017   ? Pseudophakia of both eyes    ? Pseudophakia, both eyes 8/12/2014   ? Radiation proctitis    ? Rectal bleeding    ? Renal insufficiency 7/12/2013   ? Routine history and physical examination of adult 2/8/2021    Overview:  65+yo Male Routine Health Maintenance (ICSI 2007 Guidelines) Hypertension and Obesity: see chart review   Depression: no Alcohol:  Aspirin prophylaxis: yes Osteoporosis risks and prevention assessed/discussed:  Vision:  Hearing:  Tobacco use/cessation (if indicated): no  Lipids: 1/9/1009 Colon CA:  AAA (if age 65-74 and have ever smoked 100+ cigarettes in lifetime):     Vaccines (Td/TDa   ? Type 2 diabetes mellitus (H) 10/20/2009    Overview:  a system change updated this record. This will not affect patient care or billing. This comment can be deleted.    ? Typical atrial flutter (H) 1/16/2020        Surgical History:   Past Surgical History:   Procedure Laterality Date   ? CARDIAC SURGERY     ? COLONOSCOPY      x 2   ? CORONARY ARTERY BYPASS GRAFT  2001    CABG x4    ? EYE SURGERY      Cataract Removal Left    ? flexible sigmoidscopy      ? INTRAOCULAR LENS INSERTION      Kelman phacoemulsification clear corneal intraocular lens implant - Right    ? IR PLEURAL DRAINAGE W CATHETER INSERTION  10/6/2020   ? JOINT REPLACEMENT Left     knee   ? CA COLONOSCOPY FLX DX W/COLLJ SPEC WHEN PFRMD N/A 1/28/2020    Procedure: COLONOSCOPY WITH TATTOO;  Surgeon: Raul Taylor MD;  Location: [facility] OR;  Service: Gastroenterology   ? CA TOTAL KNEE ARTHROPLASTY Right  4/20/2017    Procedure: RIGHT TOTAL KNEE ARTHROPLASTY;  Surgeon: Kendrick Ramirez MD;  Location: [facility];  Service: Orthopedics        Medications:    Current Outpatient Medications:   ?  acetaminophen-codeine (TYLENOL #3) 300-30 mg per tablet, Take 1-2 tablets by mouth., Disp: , Rfl:   ?  allopurinol (ZYLOPRIM) 100 MG tablet, Take 100 mg by mouth daily., Disp: , Rfl:   ?  aspirin 81 mg chewable tablet, Chew 81 mg at bedtime., Disp: , Rfl:   ?  atorvastatin (LIPITOR) 40 MG tablet, Take 40 mg by mouth at bedtime., Disp: , Rfl:   ?  bimatoprost (LUMIGAN) 0.01 % Drop, Apply 1 drop to eye., Disp: , Rfl:   ?  carvediloL (COREG) 12.5 MG tablet, Take 18.75 mg by mouth 2 (two) times a day with meals. 1 and 1/2 tablets, Disp: , Rfl:   ?  ferrous sulfate 324 mg (65 mg iron) TbEC, Take 324 mg by mouth., Disp: , Rfl:   ?  glimepiride (AMARYL) 2 MG tablet, Take 2 mg by mouth every morning before breakfast., Disp: , Rfl:   ?  latanoprost (XALATAN) 0.005 % ophthalmic solution, Apply 1 drop to eye., Disp: , Rfl:   ?  mecobalamin, vitamin B12, 1,000 mcg TbDL, Place 1 tablet (1,000 mcg total) under the tongue daily., Disp: 30 tablet, Rfl: 1  ?  melatonin 5 mg Tab tablet, Take 5 mg by mouth at bedtime., Disp: , Rfl:   ?  nitroglycerin (NITROSTAT) 0.4 MG SL tablet, Place 0.4 mg under the tongue every 5 (five) minutes as needed for chest pain., Disp: , Rfl:   ?  omeprazole (PRILOSEC OTC) 20 MG tablet, Take 1 tablet (20 mg total) by mouth daily before breakfast., Disp: 60 tablet, Rfl: 1  ?  omeprazole (PRILOSEC) 20 MG capsule, TAKE 1 CAPSULE BY MOUTH BEFORE BREAKFAST, Disp: , Rfl:   ?  psyllium (METAMUCIL) 3.4 gram packet, Take 1 packet by mouth daily., Disp: , Rfl:   ?  spironolactone (ALDACTONE) 25 MG tablet, Take 12.5 mg by mouth., Disp: , Rfl:   ?  travoprost (TRAVATAN Z) 0.004 % ophthalmic drops, Administer 1 drop to both eyes at bedtime., Disp: , Rfl:   ?  travoprost, benzalkonium, (TRAVATAN) 0.004 % ophthalmic solution,  Apply 1 drop to eye., Disp: , Rfl:   ?  vit C/E/Zn/coppr/lutein/zeaxan (PRESERVISION AREDS-2 ORAL), Take 1 capsule by mouth 2 (two) times a day., Disp: , Rfl:   ?  warfarin ANTICOAGULANT (COUMADIN/JANTOVEN) 1 MG tablet, Take 1.5-2 tablets (1.5-2 mg total) by mouth See Admin Instructions. 2 mg on Tues and Friday. 1.5 mg on the other 5 days of the week.  Takes in the evening.  Adjust dose based on INR results as directed. (Patient taking differently: Take 1-1.5 mg by mouth See Admin Instructions. 1 mg on TUES 1.5 mg all other days), Disp: , Rfl: 0    Allergies:   Allergies   Allergen Reactions   ? Metformin Dizziness       Family history:   Family History   Problem Relation Age of Onset   ? Cancer Mother         Colon Cancer   ? Stroke Father    ? Gout Father    ? Diabetes Brother    ? Heart disease Brother         Social History:   Social History     Socioeconomic History   ? Marital status:      Spouse name: Not on file   ? Number of children: Not on file   ? Years of education: Not on file   ? Highest education level: Not on file   Occupational History   ? Not on file   Social Needs   ? Financial resource strain: Not on file   ? Food insecurity     Worry: Not on file     Inability: Not on file   ? Transportation needs     Medical: Not on file     Non-medical: Not on file   Tobacco Use   ? Smoking status: Former Smoker     Quit date: 1968     Years since quittin.1   ? Smokeless tobacco: Former User   Substance and Sexual Activity   ? Alcohol use: Yes     Comment: rare   ? Drug use: No   ? Sexual activity: Not on file   Lifestyle   ? Physical activity     Days per week: Not on file     Minutes per session: Not on file   ? Stress: Not on file   Relationships   ? Social connections     Talks on phone: Not on file     Gets together: Not on file     Attends Rastafari service: Not on file     Active member of club or organization: Not on file     Attends meetings of clubs or organizations: Not on file      Relationship status: Not on file   ? Intimate partner violence     Fear of current or ex partner: Not on file     Emotionally abused: Not on file     Physically abused: Not on file     Forced sexual activity: Not on file   Other Topics Concern   ? Not on file   Social History Narrative   ? Not on file        Physical Exam  Vitals: Blood pressure 140/80, pulse 80, temperature 98.1  F (36.7  C), temperature source Oral, weight 210 lb 1.6 oz (95.3 kg).  General: This is a 85 y.o. male who appears their reported age, not in acute distress  Head: normocephalic, Atraumatic; wearing glasses; non-icteric sclera; no exudate; mild hearing loss  Respiratory: Clear throughout all lung fields; unlabored breathing; no cough   Cardiac: Irregularly irregular, Rate and Rhythm, no murmurs appreciated   Skin: Uniformly warm and dry  Psych: Alert and oriented x4; calm and cooperative throughout exam  Abdomen: Normal bowel sounds. Soft, symmetric, no guarding or rigidity, nontender with palpation.  No organomegaly or masses palpated.   Extremities: BLE with edema; right worse than left; +2 in the right; +1 in the left; skin intact; there is rubor to the right shin however this is not painful nor warm to the touch; there is scaling on the shins this is mild; sparse hair growth to the toes; nails wnl well trimmed; webbing clear; strength testing revealed 4/4 to BLEs.    Sensation: Intact to pinprick and light touch throughout lower extremities bilaterally       Peripheral Vascular: normal dorsalis pedis, posterior tibial pulses to bilateral feet , using a handheld doppler these were strong easily found and biphasic in nature.  Good capillary refill. No unusual venous distention. Positive for spider veins, telangiectasias, hemosiderin deposition or hyperpigmentation and fibrosis or scarring      Circumferential volume measures:    Vasc Edema 2/9/2021   Right just above MTP 23   Right Ankle 25.5   Right Widest Calf 38.6   Left - just above  MTP 22.4   Left Ankle 22.1   Left Widest Calf 33.7       Ulceration(s)/Wound(s):          Laboratory studies:   No results found for: SEDRATE  Lab Results   Component Value Date    CREATININE 0.93 10/06/2020     Lab Results   Component Value Date    HGBA1C 6.2 (H) 09/24/2020     Lab Results   Component Value Date    BUN 13 10/06/2020     Lab Results   Component Value Date    ALBUMIN 3.7 10/06/2020     No results found for: [id number]          Impression:   1. Venous hypertension of both lower extremities  US Venous Insufficiency Legs Bilateral    Change dressing   2. Venous insufficiency of both lower extremities  US Venous Insufficiency Legs Bilateral    Change dressing   3. Dependent edema  US Venous Insufficiency Legs Bilateral    Change dressing       2/9/2021 BLEs          Assessment/Plan:  1. Debridement: na    2. Treatment: wound treatment will include irrigation and dressings to promote autolytic debridement which will include: lotion; no wounds; no dressings indicated    3. Edema. We spoke at length regarding their swelling, potential causes, and treatment options. Answered all questions. Their swelling is likely multifactorial including but not limited to the following: their weight, dependency of the limbs for most hours of the day, reduced activity with reduced calf pump mechanism, congestive heart failure, kidney disease, venous hypertension, valvular incompetence, side effect of certain medications and history of joint replacements. I would like to first reduce their swelling down using 2 layer compression wraps and then transition them into compression garments; such as compression stockings or velcro wraps. Provided them with [employer] walker catalogue with inexpensive options for compression to purchase. The patient should continue to elevate their legs periodically throughout the day. Continue to take their diuretics per their PMD recommendations. I will also obtain venous insufficiency ultrasound to  evaluate for incompetence; send to Dr. Corrigan pending results.       4. Offloading: na    5. Nutrition: focus on protein; low sodium; diabetic diet; sugars are well controlled    Patient to return to clinic in 4 week(s) for re-evaluation by me; nurse visits twice per week until then. They were instructed to call the clinic sooner with any further questions or concerns. Answered all questions.              Nichole Diggs DNP, RN, CNP, CWOCN  [facility]  [phone]      This note was electronically signed by Nichole Diggs

## 2021-06-30 NOTE — PROGRESS NOTES
Progress Notes by Nicole Andrade LPN at 2/23/2021  2:00 PM     Author: Nicole Andrade LPN Service: -- Author Type: Licensed Nurse    Filed: 2/25/2021  5:22 PM Encounter Date: 2/23/2021 Status: Signed    : Nicole Andrade LPN (Licensed Nurse)       Nurse Visit        Chief Complaint: Patient presents to clinic for assessment and treatment of  bilateral leg swelling .    Dressing on Arrival: 2 layer wrap intact/clean    Patient came in today for dressing change per order from NP, reagan Dgigs. Patient rated pain 0 out of 10. Removed dressings and cleansed skin with soap. Applied lotion to intact skin. Rewrapped 2 layer compression wrap to bilaterally leg. Patient tolerated dressing change well.        Allergies:   Allergies   Allergen Reactions   ? Metformin Dizziness       Medications:   Current Outpatient Medications:   ?  acetaminophen-codeine (TYLENOL #3) 300-30 mg per tablet, Take 1-2 tablets by mouth., Disp: , Rfl:   ?  allopurinol (ZYLOPRIM) 100 MG tablet, Take 100 mg by mouth daily., Disp: , Rfl:   ?  aspirin 81 mg chewable tablet, Chew 81 mg at bedtime., Disp: , Rfl:   ?  atorvastatin (LIPITOR) 40 MG tablet, Take 40 mg by mouth at bedtime., Disp: , Rfl:   ?  bimatoprost (LUMIGAN) 0.01 % Drop, Apply 1 drop to eye., Disp: , Rfl:   ?  carvediloL (COREG) 12.5 MG tablet, Take 18.75 mg by mouth 2 (two) times a day with meals. 1 and 1/2 tablets, Disp: , Rfl:   ?  ferrous sulfate 324 mg (65 mg iron) TbEC, Take 324 mg by mouth., Disp: , Rfl:   ?  glimepiride (AMARYL) 2 MG tablet, Take 2 mg by mouth every morning before breakfast., Disp: , Rfl:   ?  latanoprost (XALATAN) 0.005 % ophthalmic solution, Apply 1 drop to eye., Disp: , Rfl:   ?  mecobalamin, vitamin B12, 1,000 mcg TbDL, Place 1 tablet (1,000 mcg total) under the tongue daily., Disp: 30 tablet, Rfl: 1  ?  melatonin 5 mg Tab tablet, Take 5 mg by mouth at bedtime., Disp: , Rfl:   ?  nitroglycerin (NITROSTAT) 0.4 MG SL tablet, Place 0.4 mg under the  tongue every 5 (five) minutes as needed for chest pain., Disp: , Rfl:   ?  omeprazole (PRILOSEC OTC) 20 MG tablet, Take 1 tablet (20 mg total) by mouth daily before breakfast., Disp: 60 tablet, Rfl: 1  ?  omeprazole (PRILOSEC) 20 MG capsule, TAKE 1 CAPSULE BY MOUTH BEFORE BREAKFAST, Disp: , Rfl:   ?  psyllium (METAMUCIL) 3.4 gram packet, Take 1 packet by mouth daily., Disp: , Rfl:   ?  spironolactone (ALDACTONE) 25 MG tablet, Take 12.5 mg by mouth., Disp: , Rfl:   ?  travoprost (TRAVATAN Z) 0.004 % ophthalmic drops, Administer 1 drop to both eyes at bedtime., Disp: , Rfl:   ?  travoprost, benzalkonium, (TRAVATAN) 0.004 % ophthalmic solution, Apply 1 drop to eye., Disp: , Rfl:   ?  vit C/E/Zn/coppr/lutein/zeaxan (PRESERVISION AREDS-2 ORAL), Take 1 capsule by mouth 2 (two) times a day., Disp: , Rfl:   ?  warfarin ANTICOAGULANT (COUMADIN/JANTOVEN) 1 MG tablet, Take 1.5-2 tablets (1.5-2 mg total) by mouth See Admin Instructions. 2 mg on Tues and Friday. 1.5 mg on the other 5 days of the week.  Takes in the evening.  Adjust dose based on INR results as directed. (Patient taking differently: Take 1-1.5 mg by mouth See Admin Instructions. 1 mg on TUES 1.5 mg all other days), Disp: , Rfl: 0    Vital Signs: /64 (Patient Site: Left Arm, Patient Position: Sitting, Cuff Size: Adult Regular)   Pulse 72   Temp 98.1  F (36.7  C) (Temporal)   Resp 24       Assessment:    General:  Patient presents to clinic in no apparent distress.  Psychiatric:  Alert and oriented .   Lower extremity:  edema is present.    Integumentary:  Skin is uniformly warm, dry and pink.    Wound size:     Undermining is not present.    The periwoundskin is WNL      Plan:         1. Patient will follow up on bilateral leg swelling         2. Treatment provided will include irrigation and dressings to promote autolytic debridement and will be as listed below     Protected skin with: Remedy Skin Repair    Compression Applied to the Left Le-Layer  Coban    2-Layer Coban:Applied the inner foam layer with the foot dorsiflexed and starting atthe base of the fifth metatarsal head. Leaving the bottom of the heel exposed, proceed by winding the foam up the leg using minimaloverlap to just below the fibular head. Apply the compression layer with the foot dorsiflexed and startingat the base of the fifth metatarsal head. Apply at full stretch and proceed up the leg using 50% overlap. The bottom of the heel is covered with the compression layer. The end at the fibular head or just below the back of the knee and levelwith the top edge of the foam layer.  Gently pressed and conformed the entire surface of the system to ensurethat the two layers are firmly bound together    Compression Applied to the Right Le-Layer Coban    2-Layer Coban:Applied the inner foam layer with the foot dorsiflexed and starting atthe base of the fifth metatarsal head. Leaving the bottom of the heel exposed, proceed by winding the foam up the leg using minimaloverlap to just below the fibular head. Apply the compression layer with the foot dorsiflexed and startingat the base of the fifth metatarsal head. Apply at full stretch and proceed up the leg using 50% overlap. The bottom of the heel is covered with the compression layer. The end at the fibular head or just below the back of the knee and levelwith the top edge of the foam layer.  Gently pressed and conformed the entire surface of the system to ensurethat the two layers are firmly bound together    Offloading applied: None  Trial Products: no  Provider notified regarding concerns: no  Treatment Changes: no    Educational Barriers: none  Taught Regarding: Activity and Compression  Teaching Method: Explanation and DC sheet

## 2021-06-30 NOTE — PROGRESS NOTES
Progress Notes by Nicole Andrade LPN at 2/19/2021  8:00 AM     Author: Nicole Andrade LPN Service: -- Author Type: Licensed Nurse    Filed: 2/19/2021  9:54 AM Encounter Date: 2/19/2021 Status: Signed    : Nicole Andrade LPN (Licensed Nurse)       Nurse Visit        Chief Complaint: Patient presents to clinic for assessment and treatment of  bilateral leg swelling    Dressing on Arrival: 2 layer wrap intact and clean    Patient came in today for dressing change and 2 layer rewrap per order from NP Nichole Diggs. Patient rated pain 0 out of 10. Removed dressings and cleansed skin with soap. Applied lotion to intact skin. Rewrapped 2 layer compression wrap to bilateral leg. Patient tolerated dressing change well. Measurement of today given to patient and spouse; they will order compression and bring to nurse visit, if they do receive them Monday.        Allergies:   Allergies   Allergen Reactions   ? Metformin Dizziness       Medications:   Current Outpatient Medications:   ?  acetaminophen-codeine (TYLENOL #3) 300-30 mg per tablet, Take 1-2 tablets by mouth., Disp: , Rfl:   ?  allopurinol (ZYLOPRIM) 100 MG tablet, Take 100 mg by mouth daily., Disp: , Rfl:   ?  aspirin 81 mg chewable tablet, Chew 81 mg at bedtime., Disp: , Rfl:   ?  atorvastatin (LIPITOR) 40 MG tablet, Take 40 mg by mouth at bedtime., Disp: , Rfl:   ?  bimatoprost (LUMIGAN) 0.01 % Drop, Apply 1 drop to eye., Disp: , Rfl:   ?  carvediloL (COREG) 12.5 MG tablet, Take 18.75 mg by mouth 2 (two) times a day with meals. 1 and 1/2 tablets, Disp: , Rfl:   ?  ferrous sulfate 324 mg (65 mg iron) TbEC, Take 324 mg by mouth., Disp: , Rfl:   ?  glimepiride (AMARYL) 2 MG tablet, Take 2 mg by mouth every morning before breakfast., Disp: , Rfl:   ?  latanoprost (XALATAN) 0.005 % ophthalmic solution, Apply 1 drop to eye., Disp: , Rfl:   ?  mecobalamin, vitamin B12, 1,000 mcg TbDL, Place 1 tablet (1,000 mcg total) under the tongue daily., Disp: 30 tablet,  Rfl: 1  ?  melatonin 5 mg Tab tablet, Take 5 mg by mouth at bedtime., Disp: , Rfl:   ?  nitroglycerin (NITROSTAT) 0.4 MG SL tablet, Place 0.4 mg under the tongue every 5 (five) minutes as needed for chest pain., Disp: , Rfl:   ?  omeprazole (PRILOSEC OTC) 20 MG tablet, Take 1 tablet (20 mg total) by mouth daily before breakfast., Disp: 60 tablet, Rfl: 1  ?  omeprazole (PRILOSEC) 20 MG capsule, TAKE 1 CAPSULE BY MOUTH BEFORE BREAKFAST, Disp: , Rfl:   ?  psyllium (METAMUCIL) 3.4 gram packet, Take 1 packet by mouth daily., Disp: , Rfl:   ?  spironolactone (ALDACTONE) 25 MG tablet, Take 12.5 mg by mouth., Disp: , Rfl:   ?  travoprost (TRAVATAN Z) 0.004 % ophthalmic drops, Administer 1 drop to both eyes at bedtime., Disp: , Rfl:   ?  travoprost, benzalkonium, (TRAVATAN) 0.004 % ophthalmic solution, Apply 1 drop to eye., Disp: , Rfl:   ?  vit C/E/Zn/coppr/lutein/zeaxan (PRESERVISION AREDS-2 ORAL), Take 1 capsule by mouth 2 (two) times a day., Disp: , Rfl:   ?  warfarin ANTICOAGULANT (COUMADIN/JANTOVEN) 1 MG tablet, Take 1.5-2 tablets (1.5-2 mg total) by mouth See Admin Instructions. 2 mg on Tues and Friday. 1.5 mg on the other 5 days of the week.  Takes in the evening.  Adjust dose based on INR results as directed. (Patient taking differently: Take 1-1.5 mg by mouth See Admin Instructions. 1 mg on TUES 1.5 mg all other days), Disp: , Rfl: 0    Vital Signs: /64 (Patient Site: Left Arm, Patient Position: Sitting, Cuff Size: Adult Regular)   Pulse 60   Temp 98.2  F (36.8  C) (Temporal)   Resp 20       Assessment:    General:  Patient presents to clinic in no apparent distress.  Psychiatric:  Alert and oriented .   Lower extremity:  edema is present.    Integumentary:  Skin is uniformly warm, dry and pink.    Wound size:     Undermining no wound.    The periwoundskin is WNL      Plan:         1. Patient will follow up on Tuesday with nurse visit         2. Treatment provided will include irrigation and dressings to  promote autolytic debridement and will be as listed below     Cleansed with: soap and water    Protected skin with: Remedy Skin Repair    Dressings Applied: NA    Compression Applied to the Left Le-Layer Coban      2-Layer Coban:Applied the inner foam layer with the foot dorsiflexed and starting atthe base of the fifth metatarsal head. Leaving the bottom of the heel exposed, proceed by winding the foam up the leg using minimaloverlap to just below the fibular head. Apply the compression layer with the foot dorsiflexed and startingat the base of the fifth metatarsal head. Apply at full stretch and proceed up the leg using 50% overlap. The bottom of the heel is covered with the compression layer. The end at the fibular head or just below the back of the knee and levelwith the top edge of the foam layer.  Gently pressed and conformed the entire surface of the system to ensurethat the two layers are firmly bound together    Compression Applied to the Right Le-Layer Coban    2-Layer Coban:Applied the inner foam layer with the foot dorsiflexed and starting atthe base of the fifth metatarsal head. Leaving the bottom of the heel exposed, proceed by winding the foam up the leg using minimaloverlap to just below the fibular head. Apply the compression layer with the foot dorsiflexed and startingat the base of the fifth metatarsal head. Apply at full stretch and proceed up the leg using 50% overlap. The bottom of the heel is covered with the compression layer. The end at the fibular head or just below the back of the knee and levelwith the top edge of the foam layer.  Gently pressed and conformed the entire surface of the system to ensurethat the two layers are firmly bound together    Offloading applied: None  Trial Products: no  Provider notified regarding concerns: no  Treatment Changes: no    Educational Barriers: none  Taught Regarding: compression  Teaching Method: Explanation and DC sheet

## 2021-06-30 NOTE — PROGRESS NOTES
Progress Notes by Deandre Corrigan MD at 3/16/2021  8:20 AM     Author: Deandre Corrigan MD Service: -- Author Type: Physician    Filed: 3/16/2021  8:53 AM Encounter Date: 3/16/2021 Status: Signed    : Deandre Corrigan MD (Physician)           [facility] Vein Consult      Assessment:     1. varicose veins, bilateral   2. venous stasis ulcer, bilateral in the past, hemosiderin and venous htn bilateral  3. Insuffiencey of right greater saphenous vein,    Plan:     1. Treatment options of conservative therapy of stockings use, exercise, weight loss, elevating legs when possible.    2. Script for compression stockings 20-30 mm hg  3. Ultrasound to evaluate legs for incompetency of both deep and superficial system .   4. Surgical treatment   Endovenous closure ofright, greater saphenous vein, venaseal   Risks and benefits of surgical intervention including infection, burns, dvt, thrombophlebitis, not closing, recurrence, numbness and nerve injury and need for further intervention were all discused    5. Follow up: for procedure if approved.   6. Call for any questions concerns or issues    Subjective:      Phi Christine is a 85 y.o. male  who was referred by Cristy Enriquez MD  for evaluation of varicose veins. Symptoms include pain, aching, fatigue, burning, edema, dermatitis and ulcers  . Patient has history of leg selling, pain and vein issues that have progressed. Pain and symptoms have affected daily living and work activities needing medications. Here for evaluation today. Stocking use with compression stockings of 20-30 mm hg or greater for greater then 3 months    Allergies:Metformin    Past Medical History:   Diagnosis Date   ? Accelerated hypertension    ? ACP (advance care planning) 1/16/2020   ? Actinic keratosis    ? Acute blood loss anemia    ? Acute blood loss as cause of postoperative anemia 4/22/2017   ? Acute kidney injury (H)    ? Adenocarcinoma of prostate (H)    ? Adenoma of  sigmoid colon    ? Adenomatous polyp of sigmoid colon 3/6/2020   ? Age-related macular degeneration    ? KYLAH (acute kidney injury) (H) 2/8/2021   ? Anemia associated with acute blood loss    ? Anticoagulation goal of INR 2 to 3 4/2/2020   ? Anticoagulation monitoring, INR range 2-3 9/15/2020   ? ARMD (age related macular degeneration)    ? Aspirin intolerance 9/2/2016    Overview:  Bloody nose.   ? Atrial flutter (H)    ? Borderline glaucoma with ocular hypertension    ? CHF (congestive heart failure) (H)    ? Chronic coronary artery disease 4/22/2017   ? Chronic diastolic heart failure (H) 1/15/2014   ? Chronic kidney disease    ? Chronic renal insufficiency, stage III (moderate)    ? Closed fracture of three ribs of right side, initial encounter 9/26/2020   ? Colonic mass 1/17/2020   ? Coronary artery disease    ? Diabetes mellitus (H)     Type II   ? Diastolic heart failure (H)    ? Dyslipidemia    ? Dyspnea 7/17/2020   ? Encephalopathy 7/17/2020   ? Facial droop    ? Fall at home, initial encounter 9/24/2020   ? Foot drop, left foot    ? Glaucoma 6/22/2020   ? Gout    ? Hemothorax on right    ? High cholesterol    ? History of coronary artery bypass surgery 4/22/2017   ? Hyperkalemia 4/17/2020   ? Hypertension    ? Iron deficiency anemia due to chronic blood loss 9/26/2020   ? Lower GI bleed    ? Myocardial infarction (H)     16 years ago   ? Obesity    ? Obesity (BMI 30-39.9) 7/12/2013   ? Pleural effusion 10/6/2020   ? Primary osteoarthritis of right knee 4/20/2017   ? Pseudophakia of both eyes    ? Pseudophakia, both eyes 8/12/2014   ? Radiation proctitis    ? Rectal bleeding    ? Renal insufficiency 7/12/2013   ? Routine history and physical examination of adult 2/8/2021    Overview:  65+yo Male Routine Health Maintenance (ICSI 2007 Guidelines) Hypertension and Obesity: see chart review   Depression: no Alcohol:  Aspirin prophylaxis: yes Osteoporosis risks and prevention assessed/discussed:  Vision:   Hearing:  Tobacco use/cessation (if indicated): no  Lipids: 1/9/1009 Colon CA:  AAA (if age 65-74 and have ever smoked 100+ cigarettes in lifetime):     Vaccines (Td/TDa   ? Type 2 diabetes mellitus (H) 10/20/2009    Overview:  a system change updated this record. This will not affect patient care or billing. This comment can be deleted.    ? Typical atrial flutter (H) 1/16/2020       Past Surgical History:   Procedure Laterality Date   ? CARDIAC SURGERY     ? COLONOSCOPY      x 2   ? CORONARY ARTERY BYPASS GRAFT  2001    CABG x4    ? EYE SURGERY      Cataract Removal Left    ? flexible sigmoidscopy      ? INTRAOCULAR LENS INSERTION      Kelman phacoemulsification clear corneal intraocular lens implant - Right    ? IR PLEURAL DRAINAGE W CATHETER INSERTION  10/6/2020   ? JOINT REPLACEMENT Left     knee   ? DC COLONOSCOPY FLX DX W/COLLJ SPEC WHEN PFRMD N/A 1/28/2020    Procedure: COLONOSCOPY WITH TATTOO;  Surgeon: Raul Taylor MD;  Location: [facility] OR;  Service: Gastroenterology   ? DC TOTAL KNEE ARTHROPLASTY Right 4/20/2017    Procedure: RIGHT TOTAL KNEE ARTHROPLASTY;  Surgeon: Kendrick Ramirez MD;  Location: [facility] OR;  Service: Orthopedics       Current Outpatient Medications   Medication Sig   ? acetaminophen-codeine (TYLENOL #3) 300-30 mg per tablet Take 1-2 tablets by mouth.   ? allopurinol (ZYLOPRIM) 100 MG tablet Take 100 mg by mouth daily.   ? aspirin 81 mg chewable tablet Chew 81 mg at bedtime.   ? atorvastatin (LIPITOR) 40 MG tablet Take 40 mg by mouth at bedtime.   ? bimatoprost (LUMIGAN) 0.01 % Drop Apply 1 drop to eye.   ? carvediloL (COREG) 12.5 MG tablet Take 18.75 mg by mouth 2 (two) times a day with meals. 1 and 1/2 tablets   ? ferrous sulfate 324 mg (65 mg iron) TbEC Take 324 mg by mouth.   ? glimepiride (AMARYL) 2 MG tablet Take 2 mg by mouth every morning before breakfast.   ? hydrALAZINE (APRESOLINE) 10 MG tablet Take 10 mg by mouth 3 (three) times a day.   ? latanoprost  (XALATAN) 0.005 % ophthalmic solution Apply 1 drop to eye.   ? mecobalamin, vitamin B12, 1,000 mcg TbDL Place 1 tablet (1,000 mcg total) under the tongue daily.   ? melatonin 5 mg Tab tablet Take 5 mg by mouth at bedtime.   ? nitroglycerin (NITROSTAT) 0.4 MG SL tablet Place 0.4 mg under the tongue every 5 (five) minutes as needed for chest pain.   ? omeprazole (PRILOSEC OTC) 20 MG tablet Take 1 tablet (20 mg total) by mouth daily before breakfast.   ? omeprazole (PRILOSEC) 20 MG capsule TAKE 1 CAPSULE BY MOUTH BEFORE BREAKFAST   ? psyllium (METAMUCIL) 3.4 gram packet Take 1 packet by mouth daily.   ? spironolactone (ALDACTONE) 25 MG tablet Take 12.5 mg by mouth.   ? travoprost (TRAVATAN Z) 0.004 % ophthalmic drops Administer 1 drop to both eyes at bedtime.   ? travoprost, benzalkonium, (TRAVATAN) 0.004 % ophthalmic solution Apply 1 drop to eye.   ? vit C/E/Zn/coppr/lutein/zeaxan (PRESERVISION AREDS-2 ORAL) Take 1 capsule by mouth 2 (two) times a day.   ? warfarin ANTICOAGULANT (COUMADIN/JANTOVEN) 1 MG tablet Take 1.5-2 tablets (1.5-2 mg total) by mouth See Admin Instructions. 2 mg on Tues and Friday. 1.5 mg on the other 5 days of the week.  Takes in the evening.  Adjust dose based on INR results as directed. (Patient taking differently: Take 1-1.5 mg by mouth See Admin Instructions. 1 mg on TUES  1.5 mg all other days)       Family History   Problem Relation Age of Onset   ? Cancer Mother         Colon Cancer   ? Stroke Father    ? Gout Father    ? Diabetes Brother    ? Heart disease Brother         reports that he quit smoking about 53 years ago. He has quit using smokeless tobacco. He reports current alcohol use. He reports that he does not use drugs.      Review of Systems:  Pertinent items are noted in HPI.  A 12 point comprehensive review of systems was negative except as noted.   Patient has symptomatic veins and changes of bilateral legs. These have progressed to the point of causing symptoms on a daily  basis. This causes issues with daily activities and chores such as mowing lawn, outdoor upkeep and standing for long lengths of time       Objective:     Vitals:    03/16/21 0817   BP: 138/84   Patient Site: Left Arm   Patient Position: Sitting   Cuff Size: Adult Regular   Pulse: 84   Resp: 28   Temp: 97.5  F (36.4  C)   TempSrc: Temporal   SpO2: 96%   Weight: 211 lb (95.7 kg)     Body mass index is 32.08 kg/m .    EXAM:  GENERAL: This is a well-developed 85 y.o. male who appears his stated age  HEAD: normocephalic  HEENT: Pupils equal and reactive bilaterally  MOUTH: mucus membranes intact. Normal dentation  CARDIAC: RRR without murmur  CHEST/LUNG:  Clear to auscultation bilaterally  ABDOMEN: Soft, nontender, nondistended, no masses noted   NEUROLOGIC: Focally intact, nonfocal, alert and oriented x 3  INTEGUMENT: No open lesions or ulcers  VASCULAR: Pulses intact, symmetrical upper and lower extremities. There areskin changes consistent with chronic venous insufficiency. Varicose veins present in bilateral greater saphenous distribution. Spider veins present bilateral.                    Imaging:    US Venous Insufficiency Legs Bilateral (Order [id number])  Imaging  Date: 2/11/2021 Department: [facility] [place] Released By: Gloria Shen Authorizing: Nichole Diggs NP   Study Result    BILATERAL Venous Insufficiency Ultrasound (02/11/21)    BILATERAL Lower Extremity          Indication:  SURVEILLANCE BILATERAL LEG VARICOSE VEINS, PAIN AND SWELLING.      Previous: NONE     Patient History: Swelling     Presenting Symptoms:  Swelling, Varicose Veins and Pain     Technique:   Supine and Upright Ultrasound of the Deep and Superficial Veins with Valsalva and Compression Augmentation Maneuvers. Duplex Imaging is performed utilizing gray-scale, Two-dimensional images, color-flow imaging, Doppler waveform analysis, and Spectral doppler imaging done with provacative maneuvers.       Incompetency Criteria:  Deep vein reflux reported when greater than 1000 msec flow reversal. Superficial vein reflux reported when equal to or greater than 600 msec flow reversal.  vein reflux reported as greater than 350 msec flow reversal.      Right  Leg Deep Veins    CFV SFJ PFV PROX SFV   PROX SFV MID SFV DIST POP V. PERON.   V. PTV'S   Compressibility  (FC,PC,NC) FC FC FC FC FC FC FC FC FC   Reflux -   - - - - -   -         Right Leg Saphenous Veins  Location SFJ PROX THIGH KNEE MID CALF SPJ PROX CALF MID CALF   RT GSV (mm) 8.7 6.5 6.3 4.1         Reflux + + + +         RT SSV (mm)         4.6 4.9 2.8   Reflux         - - -         Left Leg Deep Veins    CFV SFJ PFV PROX SFV PROX SFV MID SFV DIST POP V. PERON. V. PTV'S   Compressibility  (FC,PC,NC) FC FC FC FC FC FC FC FC FC   Reflux -   - - - - -   -         Lt Leg Saphenous Veins   Location SFJ PROX THIGH KNEE MID CALF SPJ PROX CALF MID CALF   LT GSV (mm) 4.8 5.1 2.6 2.2         Reflux - - - -         LT SSV (mm)         5.1 5.4 5.3   Reflux         - - -         Compression Study:  Normal     Comments:      Impression:       Right Deep Vein Findings: No evidence of DVT, no evidence of deep system reflux on the right side     Left Deep Vein Findings: No evidence of DVT, no evidence of deep system reflux on the left side     Superficial Vein Findings:      Right Greater Saphenous vein: Positive for reflux for the right GSV starting from the saphenofemoral junction all the way to the mid calf     Right Small Saphenous Vein: No evidence of reflux, vein is completely competent     Left Greater Saphenous Vein: No evidence of reflux vein is completely competent     Left Small Saphenous Vein: No evidence of reflux vein is competent     Perforating and Accessory Veins: No reflux     Reference:     Compressibility: FC= Fully compressible, PC= Partially compressible, NC= Non-compressible, NV= Not Visualized     Reflux: (+) Incompetent  (-) Competent,  (NV) = Not Visualized     Interpretation criteria:          Duration of Retrograde flow (milliseconds)  Category Deep Veins Superficial Veins  veins   Competent < 1000ms < 600ms < 350ms   Incompetent > 1000ms > 600ms > 350ms            Deandre Corrigan MD  General Surgery [phone]  Vascular Surgery [phone]

## 2021-06-30 NOTE — PROGRESS NOTES
Progress Notes by Alma Siddiqui RN at 2/16/2021  8:00 AM     Author: Alma Siddiqui RN Service: -- Author Type: Registered Nurse    Filed: 2/16/2021  8:47 AM Encounter Date: 2/16/2021 Status: Signed    : Alma Siddiqui RN (Registered Nurse)       Nurse Visit        Chief Complaint: Patient presents to clinic for assessment and treatment of their and swelling    Dressing on Arrival: intact, clean 2 layer    Patient came in today for dressing change and 2  layer rewrap per order from Nichole Diggs NP. Patient rated pain 0 out of 10. Removed dressings and cleansed skin with soap. Applied lotion to intact skin.  Rewrapped 2 layer compression wrap to bilateral leg. Patient tolerated dressing change well.       Allergies:   Allergies   Allergen Reactions   ? Metformin Dizziness       Medications:   Current Outpatient Medications:   ?  acetaminophen-codeine (TYLENOL #3) 300-30 mg per tablet, Take 1-2 tablets by mouth., Disp: , Rfl:   ?  allopurinol (ZYLOPRIM) 100 MG tablet, Take 100 mg by mouth daily., Disp: , Rfl:   ?  aspirin 81 mg chewable tablet, Chew 81 mg at bedtime., Disp: , Rfl:   ?  atorvastatin (LIPITOR) 40 MG tablet, Take 40 mg by mouth at bedtime., Disp: , Rfl:   ?  bimatoprost (LUMIGAN) 0.01 % Drop, Apply 1 drop to eye., Disp: , Rfl:   ?  carvediloL (COREG) 12.5 MG tablet, Take 18.75 mg by mouth 2 (two) times a day with meals. 1 and 1/2 tablets, Disp: , Rfl:   ?  ferrous sulfate 324 mg (65 mg iron) TbEC, Take 324 mg by mouth., Disp: , Rfl:   ?  glimepiride (AMARYL) 2 MG tablet, Take 2 mg by mouth every morning before breakfast., Disp: , Rfl:   ?  latanoprost (XALATAN) 0.005 % ophthalmic solution, Apply 1 drop to eye., Disp: , Rfl:   ?  mecobalamin, vitamin B12, 1,000 mcg TbDL, Place 1 tablet (1,000 mcg total) under the tongue daily., Disp: 30 tablet, Rfl: 1  ?  melatonin 5 mg Tab tablet, Take 5 mg by mouth at bedtime., Disp: , Rfl:   ?  nitroglycerin (NITROSTAT) 0.4 MG SL tablet, Place 0.4 mg under the  tongue every 5 (five) minutes as needed for chest pain., Disp: , Rfl:   ?  omeprazole (PRILOSEC OTC) 20 MG tablet, Take 1 tablet (20 mg total) by mouth daily before breakfast., Disp: 60 tablet, Rfl: 1  ?  omeprazole (PRILOSEC) 20 MG capsule, TAKE 1 CAPSULE BY MOUTH BEFORE BREAKFAST, Disp: , Rfl:   ?  psyllium (METAMUCIL) 3.4 gram packet, Take 1 packet by mouth daily., Disp: , Rfl:   ?  spironolactone (ALDACTONE) 25 MG tablet, Take 12.5 mg by mouth., Disp: , Rfl:   ?  travoprost (TRAVATAN Z) 0.004 % ophthalmic drops, Administer 1 drop to both eyes at bedtime., Disp: , Rfl:   ?  travoprost, benzalkonium, (TRAVATAN) 0.004 % ophthalmic solution, Apply 1 drop to eye., Disp: , Rfl:   ?  vit C/E/Zn/coppr/lutein/zeaxan (PRESERVISION AREDS-2 ORAL), Take 1 capsule by mouth 2 (two) times a day., Disp: , Rfl:   ?  warfarin ANTICOAGULANT (COUMADIN/JANTOVEN) 1 MG tablet, Take 1.5-2 tablets (1.5-2 mg total) by mouth See Admin Instructions. 2 mg on Tues and Friday. 1.5 mg on the other 5 days of the week.  Takes in the evening.  Adjust dose based on INR results as directed. (Patient taking differently: Take 1-1.5 mg by mouth See Admin Instructions. 1 mg on TUES 1.5 mg all other days), Disp: , Rfl: 0    Vital Signs: /68   Pulse 60   Temp 98.2  F (36.8  C)   Resp 15       Assessment:    General:  Patient presents to clinic in no apparent distress.  Psychiatric:  Alert and oriented .   Lower extremity:  edema is present.    Integumentary:  Skin is uniformly warm, dry and pink.    Wound size:     Undermining is not present.    The periwoundskin is WNL on left leg, right leg has some redness     Vasc Edema 2/9/2021 2/16/2021   Right just above MTP 23 22.4   Right Ankle 25.5 23.5   Right Widest Calf 38.6 35.7   Left - just above MTP 22.4 21.5   Left Ankle 22.1 20.9   Left Widest Calf 33.7 31.2       Plan:         1. Patient will follow up on 2/19         2. Treatment provided will include irrigation and dressings to promote  autolytic debridement and will be as listed below     Cleansed with: soap and water, no wounds    Protected skin with: Remedy Skin Repair    Dressings Applied: none    Compression Applied to the Left Le-Layer Coban      2-Layer Coban:Applied the inner foam layer with the foot dorsiflexed and starting atthe base of the fifth metatarsal head. Leaving the bottom of the heel exposed, proceed by winding the foam up the leg using minimaloverlap to just below the fibular head. Apply the compression layer with the foot dorsiflexed and startingat the base of the fifth metatarsal head. Apply at full stretch and proceed up the leg using 50% overlap. The bottom of the heel is covered with the compression layer. The end at the fibular head or just below the back of the knee and levelwith the top edge of the foam layer.  Gently pressed and conformed the entire surface of the system to ensurethat the two layers are firmly bound together    Compression Applied to the Right Le-Layer Coban    2-Layer Coban:Applied the inner foam layer with the foot dorsiflexed and starting atthe base of the fifth metatarsal head. Leaving the bottom of the heel exposed, proceed by winding the foam up the leg using minimaloverlap to just below the fibular head. Apply the compression layer with the foot dorsiflexed and startingat the base of the fifth metatarsal head. Apply at full stretch and proceed up the leg using 50% overlap. The bottom of the heel is covered with the compression layer. The end at the fibular head or just below the back of the knee and levelwith the top edge of the foam layer.  Gently pressed and conformed the entire surface of the system to ensurethat the two layers are firmly bound together    Offloading applied: None  Trial Products: no  Provider notified regarding concerns: no  Treatment Changes: no    Educational Barriers: none  Taught Regarding: Activity, Compliance, Compression, Dressing, Hygiene and  Infection  Teaching Method: Explanation and DC sheet

## 2021-06-30 NOTE — PROGRESS NOTES
Progress Notes by Nichole Diggs NP at 3/2/2021  8:20 AM     Author: Nichole Diggs NP Service: -- Author Type: Nurse Practitioner    Filed: 3/2/2021 10:56 AM Encounter Date: 3/2/2021 Status: Signed    : Nichole Diggs NP (Nurse Practitioner)                   Follow up Vascular Visit       Date of Service:3/2/2021    Date Last Seen: 2/9/2021; 2/26/2021    Chief Complaint: BLE swelling        Pt returns to [facility] with regards to their BLE swelling.  They arrive today with wife. They are currently reporting no wounds. This is being done by the staff here at the clinic 1-2 times per week. They are using  2 layers bilaterally for compression. They are feeling well today. Denies fevers, chills. No shortness of breath. We had him complete a venous insufficiency ultrasound and this demonstrated incompetence in the right leg; was recommended he see Dr. Corrigan to see if he would benefit from RFA.     Allergies: Metformin    Medications:   Current Outpatient Medications:   ?  acetaminophen-codeine (TYLENOL #3) 300-30 mg per tablet, Take 1-2 tablets by mouth., Disp: , Rfl:   ?  allopurinol (ZYLOPRIM) 100 MG tablet, Take 100 mg by mouth daily., Disp: , Rfl:   ?  aspirin 81 mg chewable tablet, Chew 81 mg at bedtime., Disp: , Rfl:   ?  atorvastatin (LIPITOR) 40 MG tablet, Take 40 mg by mouth at bedtime., Disp: , Rfl:   ?  bimatoprost (LUMIGAN) 0.01 % Drop, Apply 1 drop to eye., Disp: , Rfl:   ?  carvediloL (COREG) 12.5 MG tablet, Take 18.75 mg by mouth 2 (two) times a day with meals. 1 and 1/2 tablets, Disp: , Rfl:   ?  ferrous sulfate 324 mg (65 mg iron) TbEC, Take 324 mg by mouth., Disp: , Rfl:   ?  glimepiride (AMARYL) 2 MG tablet, Take 2 mg by mouth every morning before breakfast., Disp: , Rfl:   ?  hydrALAZINE (APRESOLINE) 10 MG tablet, Take 10 mg by mouth 3 (three) times a day., Disp: , Rfl:   ?  latanoprost (XALATAN) 0.005 % ophthalmic solution, Apply 1 drop to eye., Disp: , Rfl:   ?   mecobalamin, vitamin B12, 1,000 mcg TbDL, Place 1 tablet (1,000 mcg total) under the tongue daily., Disp: 30 tablet, Rfl: 1  ?  melatonin 5 mg Tab tablet, Take 5 mg by mouth at bedtime., Disp: , Rfl:   ?  nitroglycerin (NITROSTAT) 0.4 MG SL tablet, Place 0.4 mg under the tongue every 5 (five) minutes as needed for chest pain., Disp: , Rfl:   ?  omeprazole (PRILOSEC OTC) 20 MG tablet, Take 1 tablet (20 mg total) by mouth daily before breakfast., Disp: 60 tablet, Rfl: 1  ?  omeprazole (PRILOSEC) 20 MG capsule, TAKE 1 CAPSULE BY MOUTH BEFORE BREAKFAST, Disp: , Rfl:   ?  psyllium (METAMUCIL) 3.4 gram packet, Take 1 packet by mouth daily., Disp: , Rfl:   ?  spironolactone (ALDACTONE) 25 MG tablet, Take 12.5 mg by mouth., Disp: , Rfl:   ?  travoprost (TRAVATAN Z) 0.004 % ophthalmic drops, Administer 1 drop to both eyes at bedtime., Disp: , Rfl:   ?  travoprost, benzalkonium, (TRAVATAN) 0.004 % ophthalmic solution, Apply 1 drop to eye., Disp: , Rfl:   ?  vit C/E/Zn/coppr/lutein/zeaxan (PRESERVISION AREDS-2 ORAL), Take 1 capsule by mouth 2 (two) times a day., Disp: , Rfl:   ?  warfarin ANTICOAGULANT (COUMADIN/JANTOVEN) 1 MG tablet, Take 1.5-2 tablets (1.5-2 mg total) by mouth See Admin Instructions. 2 mg on Tues and Friday. 1.5 mg on the other 5 days of the week.  Takes in the evening.  Adjust dose based on INR results as directed. (Patient taking differently: Take 1-1.5 mg by mouth See Admin Instructions. 1 mg on TUES 1.5 mg all other days), Disp: , Rfl: 0    History:   Past Medical History:   Diagnosis Date   ? Accelerated hypertension    ? ACP (advance care planning) 1/16/2020   ? Actinic keratosis    ? Acute blood loss anemia    ? Acute blood loss as cause of postoperative anemia 4/22/2017   ? Acute kidney injury (H)    ? Adenocarcinoma of prostate (H)    ? Adenoma of sigmoid colon    ? Adenomatous polyp of sigmoid colon 3/6/2020   ? Age-related macular degeneration    ? KYLAH (acute kidney injury) (H) 2/8/2021   ? Anemia  associated with acute blood loss    ? Anticoagulation goal of INR 2 to 3 4/2/2020   ? Anticoagulation monitoring, INR range 2-3 9/15/2020   ? ARMD (age related macular degeneration)    ? Aspirin intolerance 9/2/2016    Overview:  Bloody nose.   ? Atrial flutter (H)    ? Borderline glaucoma with ocular hypertension    ? CHF (congestive heart failure) (H)    ? Chronic coronary artery disease 4/22/2017   ? Chronic diastolic heart failure (H) 1/15/2014   ? Chronic kidney disease    ? Chronic renal insufficiency, stage III (moderate)    ? Closed fracture of three ribs of right side, initial encounter 9/26/2020   ? Colonic mass 1/17/2020   ? Coronary artery disease    ? Diabetes mellitus (H)     Type II   ? Diastolic heart failure (H)    ? Dyslipidemia    ? Dyspnea 7/17/2020   ? Encephalopathy 7/17/2020   ? Facial droop    ? Fall at home, initial encounter 9/24/2020   ? Foot drop, left foot    ? Glaucoma 6/22/2020   ? Gout    ? Hemothorax on right    ? High cholesterol    ? History of coronary artery bypass surgery 4/22/2017   ? Hyperkalemia 4/17/2020   ? Hypertension    ? Iron deficiency anemia due to chronic blood loss 9/26/2020   ? Lower GI bleed    ? Myocardial infarction (H)     16 years ago   ? Obesity    ? Obesity (BMI 30-39.9) 7/12/2013   ? Pleural effusion 10/6/2020   ? Primary osteoarthritis of right knee 4/20/2017   ? Pseudophakia of both eyes    ? Pseudophakia, both eyes 8/12/2014   ? Radiation proctitis    ? Rectal bleeding    ? Renal insufficiency 7/12/2013   ? Routine history and physical examination of adult 2/8/2021    Overview:  65+yo Male Routine Health Maintenance (ICSI 2007 Guidelines) Hypertension and Obesity: see chart review   Depression: no Alcohol:  Aspirin prophylaxis: yes Osteoporosis risks and prevention assessed/discussed:  Vision:  Hearing:  Tobacco use/cessation (if indicated): no  Lipids: 1/9/1009 Colon CA:  AAA (if age 65-74 and have ever smoked 100+ cigarettes in lifetime):     Vaccines  (Td/TDa   ? Type 2 diabetes mellitus (H) 10/20/2009    Overview:  a system change updated this record. This will not affect patient care or billing. This comment can be deleted.    ? Typical atrial flutter (H) 1/16/2020       Physical Exam:    There were no vitals taken for this visit.    General:  Patient presents to clinic in no apparent distress.  Head: normocephalic atraumatic  Psychiatric:  Alert and oriented x3.   Respiratory: unlabored breathing; no cough  Integumentary:  Skin is uniformly warm, dry and pink.    Extremities: swelling is down significantly; no wounds; skin intact; reduced inflammation; no pain; no warmth to tissues; no pain with palpation    Circumferential volume measures:    Vasc Edema 2/9/2021 2/16/2021 2/19/2021 2/23/2021   Right just above MTP 23 22.4 22 21.7   Right Ankle 25.5 23.5 23.1 22.5   Right Widest Calf 38.6 35.7 36.2 35.4   Left - just above MTP 22.4 21.5 21.7 20.8   Left Ankle 22.1 20.9 20.7 21.1   Left Widest Calf 33.7 31.2 31.7 30.8       Ulceration(s)/Wound(s):           Lab Values    No results found for: SEDRATE  Lab Results   Component Value Date    CREATININE 0.93 10/06/2020     Lab Results   Component Value Date    HGBA1C 6.2 (H) 09/24/2020     Lab Results   Component Value Date    BUN 13 10/06/2020     Lab Results   Component Value Date    ALBUMIN 3.7 10/06/2020     No results found for: [id number]          Impression:  1. Venous hypertension of both lower extremities     2. Venous insufficiency of both lower extremities     3. Dependent edema         2/1/2021 BLE         2/26/2021 BLE            Are any of these wounds new today: No; Location: na    Assessment/Plan:          1. Debridement: na     2.  Wound treatment: wound treatment will include irrigation and dressings to promote autolytic debridement which will include:lotion daily; no wounds Stable            3. Edema: he is ready to transition into compression stockings; however these have not arrived yet from  paola walker; will need to continue to wrap him until these come and then he can transition. He should meet with Dr. Corrigan to further discuss his ultrasound and see if he would benefit from RFA or continue conservative management with compression; exercise; weight management.  The compression wraps were applied today in clinic. Improved            4. Nutrition: continue to focus on low sodium high protein diet           5. Offloading: na     Patient will follow up with me PRN for reevaluation; nurse visits until the stockings arrive. They were instructed to call the clinic sooner with any signs or symptoms of infection or any further questions/concerns. Answered all questions.          Nichole Diggs DNP, RN, CNP, CWOCN, CFCN, CLT  [facility]   [phone]        This note was electronically signed by Nichole Diggs

## 2021-07-04 NOTE — LETTER
Letter by Deandre Corrigan MD at      Author: Deandre Corrigan MD Service: -- Author Type: --    Filed:  Encounter Date: 5/25/2021 Status: (Other)         Rio Menchaca MD  [address] [place] [address]                                  May 31, 2021    Patient: Phi Christine   MR Number: [id number]   YOB: 1935   Date of Visit: 5/25/2021     Dear Dr. Bernarda MD:    Thank you for referring Phi Christine to me for evaluation. Below are the relevant portions of my assessment and plan of care.    If you have questions, please do not hesitate to call me. I look forward to following Phi along with you.    Sincerely,        Deandre Corrigan MD          CC  No Recipients  Deandre Corrigan MD  5/31/2021 10:53 PM  Sign when Signing Visit  Post Procedure Note Endovenous Closure    S: Phi Christine is a 85 y.o. male S/P Right  leg endovenous closure ofRight lower ext. Two weeks out from procedure. Doing well, wore male  thigh high socks. Minimal discomfort. Some superficial phlibitis. Which is resolving.     O:   Vitals:    05/25/21 0925   BP: 144/68   Patient Site: Right Arm   Patient Position: Sitting   Cuff Size: Adult Regular   Pulse: 72   Resp: 20   Temp: 98.9  F (37.2  C)   TempSrc: Oral       General: no apparent distress  Legs look good no signs of infection, incisions healing nicely.        Imaging:    US Venous Post Ablation Leg Right (Order [id number])  Imaging  Date: 4/29/2021 Department: [facility] [place] Released By: Santa Ortega Aide Authorizing: Deandre Corrigan MD   Study Result    Right Venous Ultrasound Status Post Chemical Ablation (04/29/21)        Indication: Follow-up saphenous vein Chemical ablation     Date of Procedure: Right 4/27/2021        Right CFV/SFJ Compression:  Fully Compressible     Reference:   (FC)-Fully Compressible           (PC)-Partially Compressible   (NC) Non-Compressible     Location Right GSV   Proximal Thigh  NC   Mid Thigh NC   Distal Thigh NC   Knee NC   Proximal Calf NC   Mid Calf NC   Distal Calf NC         Comments:      Impression: Status post right great saphenous vein chemical ablation, the right GSV is noncompressible from the proximal thigh all the way down to the distal calf.  No evidence of DVT.         A/P: S/p endovenous closure. For insuffiencey of veins     May switch to knee high support socks   Resume all activities   RTC 4 months   Call for any questions or concerns     Deandre Corrigan MD   [facility]

## 2021-07-06 VITALS
SYSTOLIC BLOOD PRESSURE: 144 MMHG | DIASTOLIC BLOOD PRESSURE: 68 MMHG | HEART RATE: 72 BPM | RESPIRATION RATE: 20 BRPM | TEMPERATURE: 98.9 F

## 2021-09-21 ENCOUNTER — OFFICE VISIT (OUTPATIENT)
Dept: VASCULAR SURGERY | Facility: CLINIC | Age: 86
End: 2021-09-21
Attending: SPECIALIST
Payer: COMMERCIAL

## 2021-09-21 VITALS — HEART RATE: 88 BPM | DIASTOLIC BLOOD PRESSURE: 60 MMHG | SYSTOLIC BLOOD PRESSURE: 140 MMHG | TEMPERATURE: 98.6 F

## 2021-09-21 DIAGNOSIS — I83.891 SYMPTOMATIC VARICOSE VEINS OF RIGHT LOWER EXTREMITY: Primary | ICD-10-CM

## 2021-09-21 PROCEDURE — G0463 HOSPITAL OUTPT CLINIC VISIT: HCPCS

## 2021-09-21 PROCEDURE — 99212 OFFICE O/P EST SF 10 MIN: CPT | Performed by: SPECIALIST

## 2021-09-21 RX ORDER — HYDRALAZINE HYDROCHLORIDE 50 MG/1
50 TABLET, FILM COATED ORAL
COMMUNITY
Start: 2021-06-01

## 2021-09-21 RX ORDER — PSYLLIUM HUSK (WITH SUGAR) 3 G/12 G
1 POWDER (GRAM) ORAL
COMMUNITY

## 2021-09-21 RX ORDER — FERROUS SULFATE 324(65)MG
324 TABLET, DELAYED RELEASE (ENTERIC COATED) ORAL
COMMUNITY
Start: 2020-11-10

## 2021-09-21 RX ORDER — SPIRONOLACTONE 25 MG/1
12.5 TABLET ORAL
COMMUNITY
Start: 2020-12-23

## 2021-09-21 RX ORDER — HYDRALAZINE HYDROCHLORIDE 25 MG/1
TABLET, FILM COATED ORAL
COMMUNITY
Start: 2021-03-15

## 2021-09-21 ASSESSMENT — PAIN SCALES - GENERAL: PAINLEVEL: NO PAIN (0)

## 2021-09-21 NOTE — PATIENT INSTRUCTIONS
"It is especially important to wear them with long periods of sitting/standing, long car rides or if you will be flying. Compression socks should get refilled every 4-6 months. They do not need to be worn at night while in bed.    If you do a lot of standing it is good to do calf raises to help keep the blood pumping. If you sit a lot at work it is good to get up periodically to walk around. Elevation of the foot of your bed 4-6\" helps the blood return back to where it is needed.    Varicose Veins  Varicose veins are swollen, enlarged veins most often found in the legs. They are usually blue or purple in color and may bulge, twist, and stand out under the skin.  Normally, veins return blood from the body to the heart. The leg veins have one-way valves that prevent blood from flowing backward in the vein. When the valves are weak or damaged, blood backs up in the veins. This may cause some of the veins to swell and bulge and become varicose veins.    Symptoms  Varicose veins may or may not cause symptoms. If symptoms do occur, they can include:    Legs that feel tired, achy, heavy, or itchy    Leg muscle cramps    Skin changes, such as discoloration, dryness, redness, or rash (in more severe cases, you may also have sores on the skin called venous leg ulcers)    Risk Factors  There are a number of factors that increase the risk for varicose veins. These can include:    Being a woman    Being older    Sitting or standing for long periods    Being overweight    Being pregnant    Having a family history of varicose veins  Treatment begins with simple self-help measures (see below). If these don't help, there are many procedures that can be done to shrink or remove varicose veins. Your healthcare provider can tell you more about these options, if needed.    Home care    Support or compression stockings will likely be prescribed. If so, be sure to wear them as directed. They may help improve blood flow.    Exercising helps " strengthen your leg muscles and improve blood flow. To get the most benefit, choose exercises such as walking, swimming, or cycling. Also try to exercise for at least 30 minutes on most days.    Raising (elevating) your legs lets gravity help blood flow back to the heart. Sit or lie with your feet above heart level a few times throughout the day, or as directed.    Avoid long periods of sitting or standing. Change positions often. Also, move your ankles, toes and knees often. This may also help improve blood flow.    If you are overweight, talk with your healthcare provider about setting up a weight-loss plan. Maintaining a healthy weight can help reduce the strain on your veins. It may also improve symptoms, such as swelling and aching.    If you have dryness and itching, ask your provider about special lotions that can be applied to the skin to help improve symptoms.    Follow-up care  Follow up with your healthcare provider, or as directed. If imaging tests were done, you'll be told the results and if there are any new findings that affect your care.    When to seek medical advice  Call your healthcare provider right away if any of these occur:    Sudden, severe leg swelling, pain, or redness    Symptoms worsen, or they don't improve with self-care    Bleeding from any affected veins    Ulcers form on the legs, ankles, or feet    Fever of 100.4 F (38 C) or higher, or as advised by your provider    Understanding Spider and Varicose Veins  Do you often hide your legs because of the way they look? You may have noticed tiny red or blue bursts (spider veins). Or maybe you have veins that bulge or look twisted (varicose veins). If so, there are treatments that can help    What are the symptoms?  Spider veins or varicose veins may never be a problem. But sometimes they can cause legs to ache or swell. Your legs may also feel heavy and tired, or like they're burning. These symptoms may be more severe at the end of the  day. Prolonged sitting or standing can also make your symptoms worse.    Who gets spider and varicose veins?  Anyone can get spider or varicose veins. But vein problems tend to be hereditary (run in families). Other factors that can affect veins include:    Pregnancy, hormones, and birth control pills    A job where you stand or sit a lot    Extra weight or lack of exercise    Age    What can be done?  Spider and varicose veins can affect the way you feel about yourself. Talk to your healthcare provider about your concerns. There are treatments that can ease symptoms and make your legs look better.    Your treatment choices  Treatment may include self-care, sclerotherapy (injecting veins with a chemical), surgery, or newer nonsurgical minimally invasive therapies. Spider veins and some varicose veins can be treated with sclerotherapy. Large varicose veins can often be treated with newer minimally invasive procedures and, in rare cases, surgery may be needed.

## 2021-09-29 NOTE — PROGRESS NOTES
[facility] Surgery Follow up    HPI:    86 year old year old male who returns for a follow up. 4 months our from closure procedure, doing well.     Allergies:Metformin    Past Medical History:   Diagnosis Date     Accelerated hypertension      ACP (advance care planning) 1/16/2020     Actinic keratosis      Acute blood loss anemia      Acute blood loss as cause of postoperative anemia 4/22/2017     Acute kidney injury (H)      Adenocarcinoma of prostate (H)      Adenoma of sigmoid colon      Adenomatous polyp of sigmoid colon 3/6/2020     Age-related macular degeneration      KYLAH (acute kidney injury) (H) 2/8/2021     Anemia associated with acute blood loss      Anticoagulation goal of INR 2 to 3 4/2/2020     Anticoagulation monitoring, INR range 2-3 9/15/2020     ARMD (age related macular degeneration)      Aspirin intolerance 9/2/2016    Overview:  Bloody nose.     Atrial flutter (H)     on coumadin     Atrial flutter (H)      Borderline glaucoma with ocular hypertension      CAD (coronary artery disease)     s/p CABG (2004)     Cancer of sigmoid colon (H)      CHF (congestive heart failure) (H)      Chronic coronary artery disease 4/22/2017     Chronic diastolic heart failure (H) 1/15/2014     Chronic kidney disease      Chronic renal insufficiency, stage III (moderate)      CKD (chronic kidney disease) stage 3, GFR 30-59 ml/min      Closed fracture of three ribs of right side, initial encounter 9/26/2020     Colonic mass 1/17/2020     Coronary artery disease      Diabetes mellitus (H)     Type II     Diastolic heart failure (H)      DM2 (diabetes mellitus, type 2) (H)      Dyslipidemia      Dyspnea 7/17/2020     Encephalopathy 7/17/2020     Facial droop      Fall at home, initial encounter 9/24/2020     Foot drop, left foot      Glaucoma 6/22/2020     Gout      Gout      Hemothorax on right      High cholesterol      History of coronary artery bypass surgery 4/22/2017     HLD (hyperlipidemia)      HTN  (hypertension)      Hyperkalemia 4/17/2020     Hypertension      Iron deficiency anemia due to chronic blood loss 9/26/2020     Ischemic colitis (H)      Lower GI bleed      MI (myocardial infarction) (H)      Myocardial infarction (H)     16 years ago     Obesity      Obesity (BMI 30-39.9) 7/12/2013     Pleural effusion 10/6/2020     Primary osteoarthritis of right knee 4/20/2017     Prostate cancer (H)      Pseudophakia of both eyes      Pseudophakia, both eyes 8/12/2014     Radiation proctitis      Rectal bleeding      Renal insufficiency 7/12/2013     Routine history and physical examination of adult 2/8/2021    Overview:  65+yo Male Routine Health Maintenance (ICSI 2007 Guidelines) Hypertension and Obesity: see chart review   Depression: no Alcohol:  Aspirin prophylaxis: yes Osteoporosis risks and prevention assessed/discussed:  Vision:  Hearing:  Tobacco use/cessation (if indicated): no  Lipids: 1/9/1009 Colon CA:  AAA (if age 65-74 and have ever smoked 100+ cigarettes in lifetime):     Vaccines (Td/TDa     Type 2 diabetes mellitus (H) 10/20/2009    Overview:  a system change updated this record. This will not affect patient care or billing. This comment can be deleted.      Typical atrial flutter (H) 1/16/2020       Past Surgical History:   Procedure Laterality Date     BYPASS GRAFT ARTERY CORONARY  2001    CABG x4      C TOTAL KNEE ARTHROPLASTY Right 4/20/2017    Procedure: RIGHT TOTAL KNEE ARTHROPLASTY;  Surgeon: Kendrick Ramirez MD;  Location: [facility];  Service: Orthopedics     CARDIAC SURGERY       COLONOSCOPY      x 2     Colostomy Reversal  06/22/2020     CORONARY ARTERY BYPASS  2004     EYE SURGERY      Cataract Removal Left      INTRAOCULAR LENS INSERTION      Kelman phacoemulsification clear corneal intraocular lens implant - Right      IR CHEST TUBE PLACEMENT NON-TUNNELED RIGHT  10/6/2020     IR PLEURAL DRAINAGE WITH CATHETER INSERTION  10/6/2020     JOINT REPLACEMENT Left     knee      LAPAROSCOPIC ASSISTED SIGMOID COLECTOMY  03/05/2020     OTHER SURGICAL HISTORY      flexible sigmoidscopy      [facility] COLONOSCOPY W/WO BRUSH/WASH N/A 1/28/2020    Procedure: COLONOSCOPY WITH TATTOO;  Surgeon: Raul Taylor MD;  Location: [facility];  Service: Gastroenterology       CURRENT MEDS:  Current Outpatient Medications   Medication     allopurinol (ZYLOPRIM) 100 MG tablet     aspirin 81 MG EC tablet     atorvastatin (LIPITOR) 40 MG tablet     carvedilol (COREG) 12.5 MG tablet     Ferrous Sulfate 324 MG TBEC     glimepiride (AMARYL) 2 MG tablet     hydrALAZINE (APRESOLINE) 25 MG tablet     hydrALAZINE (APRESOLINE) 50 MG tablet     insulin glargine (BASAGLAR KWIKPEN) 100 UNIT/ML pen     LANsoprazole (PREVACID) 30 MG DR capsule     latanoprost (XALATAN) 0.005 % ophthalmic solution     levETIRAcetam (KEPPRA) 100 MG/ML solution     lisinopril (ZESTRIL) 40 MG tablet     nitroGLYcerin (NITROSTAT) 0.4 MG sublingual tablet     omeprazole (PRILOSEC) 20 MG DR capsule     predniSONE (DELTASONE) 20 MG tablet     Psyllium (FIBER) 28.3 % POWD     spironolactone (ALDACTONE) 25 MG tablet     sulfamethoxazole-trimethoprim (BACTRIM DS) 800-160 MG tablet     warfarin ANTICOAGULANT (COUMADIN) 1 MG tablet     No current facility-administered medications for this visit.       Family History   Problem Relation Age of Onset     Colon Cancer Mother      Cerebrovascular Disease Father      Gout Father      Diabetes Brother      Coronary Artery Disease Brother      Diabetes Brother      Cancer Mother         Colon Cancer     Diabetes Brother      Heart Disease Brother         reports that he quit smoking about 53 years ago. He has quit using smokeless tobacco. He reports current alcohol use. He reports that he does not use drugs.    Review of Systems:  Negative except chronic changes of legs. Otherwise twelve system of review is negative.      OBJECTIVE:  Vitals:    09/21/21 0923   BP: (!) 140/60   Pulse: 88   Temp: 98.6   F (37  C)     There is no height or weight on file to calculate BMI.    EXAM:  GENERAL: This is a well-developed 86 year old male who appears his stated age  HEAD: normocephalic  HEENT: Pupils equal and reactive bilaterally  CARDIAC: RRR without murmur  CHEST/LUNG:  Clear to auscultation  ABDOMEN: Soft, nontender, nondistended, no masses    NEUROLOGIC: Focally intact, nonfocal  VASCULAR: Pulses intact, symmetrical upper and lower extremities.                LABS:  Lab Results   Component Value Date    WBC 4.4 10/10/2020    HGB 8.4 10/10/2020    HCT 29.3 10/10/2020    MCV 88 10/10/2020     10/10/2020     INR/Prothrombin Time  @LABRCNTIP(NA,K,CL,co2,bun,creatinine,labglom,glucose,calcium)@  No results found for: HGBA1C  Lab Results   Component Value Date    ALT 18 10/06/2020    AST 32 10/06/2020    ALKPHOS 101 10/06/2020        Images:     Study Result    Narrative & Impression   Right Venous Ultrasound Status Post Chemical Ablation (04/29/21)        Indication: Follow-up saphenous vein Chemical ablation     Date of Procedure: Right 4/27/2021        Right CFV/SFJ Compression:  Fully Compressible     Reference:   (FC)-Fully Compressible   (PC)-Partially Compressible   (NC) Non-Compressible     Location Right GSV   Proximal Thigh NC   Mid Thigh NC   Distal Thigh NC   Knee NC   Proximal Calf NC   Mid Calf NC   Distal Calf NC         Comments:      Impression: Status post right great saphenous vein chemical ablation, the right GSV is noncompressible from the proximal thigh all the way down to the distal calf.  No evidence of DVT.         Assessment/Plan:   Symptomatic varicose veins of right lower extremity     S/p closure, doing well  Continue compression and exercise and return if issues develop.   Answered questions today.    No follow-ups on file.     Deandre Corrigan MD ,MD  [facility] Department of Surgery

## 2022-07-25 NOTE — PROGRESS NOTES
Reviewed US and option of right GSV venaseal option. Will need to address coumadin for heart issues and hold for 5 days bridge with lovenox will check with PCP and preauth through [email or web address] left with Holly at [phone] Dr Rio Menchaca office to see if ok to hold coumadin and bridge with lovenox.    Argelia received return call from Nicolle(4/17/21) at Dr Menchaca's office ok to hold coumadin for 5 days pre procedure. No need to bridge with lovenox. Restart post procedure when ok with Dr Corrigan.       Warm

## 2023-01-01 ENCOUNTER — APPOINTMENT (OUTPATIENT)
Dept: RADIOLOGY | Facility: HOSPITAL | Age: 88
End: 2023-01-01
Attending: EMERGENCY MEDICINE
Payer: COMMERCIAL

## 2023-01-01 ENCOUNTER — APPOINTMENT (OUTPATIENT)
Dept: CT IMAGING | Facility: HOSPITAL | Age: 88
End: 2023-01-01
Attending: EMERGENCY MEDICINE
Payer: COMMERCIAL

## 2023-01-01 ENCOUNTER — HOSPITAL ENCOUNTER (EMERGENCY)
Facility: HOSPITAL | Age: 88
Discharge: HOME | End: 2023-05-22
Attending: EMERGENCY MEDICINE | Admitting: EMERGENCY MEDICINE
Payer: COMMERCIAL

## 2023-01-01 ENCOUNTER — HOSPITAL ENCOUNTER (EMERGENCY)
Facility: HOSPITAL | Age: 88
Discharge: HOME | End: 2023-03-03
Admitting: PHYSICIAN ASSISTANT
Payer: COMMERCIAL

## 2023-01-01 ENCOUNTER — HOSPITAL ENCOUNTER (EMERGENCY)
Facility: HOSPITAL | Age: 88
Discharge: HOME | End: 2023-01-10
Attending: EMERGENCY MEDICINE | Admitting: EMERGENCY MEDICINE
Payer: COMMERCIAL

## 2023-01-01 VITALS
HEIGHT: 68 IN | WEIGHT: 222 LBS | OXYGEN SATURATION: 91 % | SYSTOLIC BLOOD PRESSURE: 160 MMHG | RESPIRATION RATE: 25 BRPM | TEMPERATURE: 100.7 F | HEART RATE: 83 BPM | DIASTOLIC BLOOD PRESSURE: 72 MMHG | BODY MASS INDEX: 33.65 KG/M2

## 2023-01-01 VITALS
DIASTOLIC BLOOD PRESSURE: 78 MMHG | TEMPERATURE: 98.9 F | HEIGHT: 71 IN | WEIGHT: 219 LBS | OXYGEN SATURATION: 96 % | BODY MASS INDEX: 30.66 KG/M2 | SYSTOLIC BLOOD PRESSURE: 149 MMHG | HEART RATE: 99 BPM | RESPIRATION RATE: 18 BRPM

## 2023-01-01 VITALS
OXYGEN SATURATION: 96 % | RESPIRATION RATE: 20 BRPM | TEMPERATURE: 98 F | BODY MASS INDEX: 33.65 KG/M2 | HEART RATE: 60 BPM | WEIGHT: 222 LBS | HEIGHT: 68 IN | SYSTOLIC BLOOD PRESSURE: 199 MMHG | DIASTOLIC BLOOD PRESSURE: 96 MMHG

## 2023-01-01 DIAGNOSIS — S91.311A LACERATION OF RIGHT FOOT, INITIAL ENCOUNTER: ICD-10-CM
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
## 2023-01-01 DIAGNOSIS — U07.1 INFECTION DUE TO 2019 NOVEL CORONAVIRUS: ICD-10-CM

## 2023-01-01 DIAGNOSIS — M62.81 GENERALIZED MUSCLE WEAKNESS: ICD-10-CM

## 2023-01-01 DIAGNOSIS — M54.50 ACUTE BILATERAL LOW BACK PAIN WITHOUT SCIATICA: ICD-10-CM
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
## 2023-01-01 DIAGNOSIS — Z79.01 CHRONIC ANTICOAGULATION: ICD-10-CM
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
## 2023-01-01 DIAGNOSIS — R50.9 FEVER, UNSPECIFIED FEVER CAUSE: ICD-10-CM

## 2023-01-01 LAB
ALBUMIN SERPL BCG-MCNC: 4.4 G/DL (ref 3.5–5.2)
ALP SERPL-CCNC: 83 U/L (ref 40–129)
ALT SERPL W P-5'-P-CCNC: 11 U/L (ref 10–50)
ANION GAP SERPL CALCULATED.3IONS-SCNC: 11 MMOL/L (ref 7–15)
AST SERPL W P-5'-P-CCNC: 18 U/L (ref 10–50)
ATRIAL RATE - MUSE: 88 BPM
BASOPHILS # BLD AUTO: 0 10E3/UL (ref 0–0.2)
BASOPHILS NFR BLD AUTO: 0 %
BILIRUB SERPL-MCNC: 0.8 MG/DL
BUN SERPL-MCNC: 20.2 MG/DL (ref 8–23)
CALCIUM SERPL-MCNC: 9.4 MG/DL (ref 8.8–10.2)
CHLORIDE SERPL-SCNC: 107 MMOL/L (ref 98–107)
CREAT SERPL-MCNC: 1.05 MG/DL (ref 0.67–1.17)
DEPRECATED HCO3 PLAS-SCNC: 28 MMOL/L (ref 22–29)
DIASTOLIC BLOOD PRESSURE - MUSE: NORMAL MMHG
EOSINOPHIL # BLD AUTO: 0 10E3/UL (ref 0–0.7)
EOSINOPHIL NFR BLD AUTO: 0 %
ERYTHROCYTE [DISTWIDTH] IN BLOOD BY AUTOMATED COUNT: 15.6 % (ref 10–15)
ERYTHROCYTE [DISTWIDTH] IN BLOOD BY AUTOMATED COUNT: 15.7 % (ref 10–15)
GFR SERPL CREATININE-BSD FRML MDRD: 70 ML/MIN/1.73M2
GLUCOSE SERPL-MCNC: 124 MG/DL (ref 70–99)
HCT VFR BLD AUTO: 38.3 % (ref 40–53)
HCT VFR BLD AUTO: 39 % (ref 40–53)
HGB BLD-MCNC: 12.2 G/DL (ref 13.3–17.7)
HGB BLD-MCNC: 12.2 G/DL (ref 13.3–17.7)
IMM GRANULOCYTES # BLD: 0 10E3/UL
IMM GRANULOCYTES NFR BLD: 0 %
INR PPP: 2.41 (ref 0.85–1.15)
INR PPP: 2.64 (ref 0.85–1.15)
INR PPP: 2.82 (ref 0.85–1.15)
INTERPRETATION ECG - MUSE: NORMAL
LYMPHOCYTES # BLD AUTO: 0.9 10E3/UL (ref 0.8–5.3)
LYMPHOCYTES NFR BLD AUTO: 10 %
MCH RBC QN AUTO: 30.8 PG (ref 26.5–33)
MCH RBC QN AUTO: 31.1 PG (ref 26.5–33)
MCHC RBC AUTO-ENTMCNC: 31.3 G/DL (ref 31.5–36.5)
MCHC RBC AUTO-ENTMCNC: 31.9 G/DL (ref 31.5–36.5)
MCV RBC AUTO: 100 FL (ref 78–100)
MCV RBC AUTO: 97 FL (ref 78–100)
MONOCYTES # BLD AUTO: 1.2 10E3/UL (ref 0–1.3)
MONOCYTES NFR BLD AUTO: 14 %
NEUTROPHILS # BLD AUTO: 6.5 10E3/UL (ref 1.6–8.3)
NEUTROPHILS NFR BLD AUTO: 76 %
NRBC # BLD AUTO: 0 10E3/UL
NRBC BLD AUTO-RTO: 0 /100
P AXIS - MUSE: NORMAL DEGREES
PLATELET # BLD AUTO: 178 10E3/UL (ref 150–450)
PLATELET # BLD AUTO: 180 10E3/UL (ref 150–450)
POTASSIUM SERPL-SCNC: 4.3 MMOL/L (ref 3.4–5.3)
PR INTERVAL - MUSE: 172 MS
PROT SERPL-MCNC: 7 G/DL (ref 6.4–8.3)
QRS DURATION - MUSE: 92 MS
QT - MUSE: 388 MS
QTC - MUSE: 469 MS
R AXIS - MUSE: 63 DEGREES
RBC # BLD AUTO: 3.92 10E6/UL (ref 4.4–5.9)
RBC # BLD AUTO: 3.96 10E6/UL (ref 4.4–5.9)
SODIUM SERPL-SCNC: 146 MMOL/L (ref 136–145)
SYSTOLIC BLOOD PRESSURE - MUSE: NORMAL MMHG
T AXIS - MUSE: 35 DEGREES
VENTRICULAR RATE- MUSE: 88 BPM
WBC # BLD AUTO: 7.7 10E3/UL (ref 4–11)
WBC # BLD AUTO: 8.7 10E3/UL (ref 4–11)

## 2023-01-01 PROCEDURE — 36415 COLL VENOUS BLD VENIPUNCTURE: CPT | Performed by: PHYSICIAN ASSISTANT

## 2023-01-01 PROCEDURE — 250N000013 HC RX MED GY IP 250 OP 250 PS 637: Performed by: EMERGENCY MEDICINE

## 2023-01-01 PROCEDURE — 73502 X-RAY EXAM HIP UNI 2-3 VIEWS: CPT

## 2023-01-01 PROCEDURE — 12001 RPR S/N/AX/GEN/TRNK 2.5CM/<: CPT

## 2023-01-01 PROCEDURE — 85025 COMPLETE CBC W/AUTO DIFF WBC: CPT | Performed by: EMERGENCY MEDICINE

## 2023-01-01 PROCEDURE — 99285 EMERGENCY DEPT VISIT HI MDM: CPT | Mod: 25

## 2023-01-01 PROCEDURE — 71045 X-RAY EXAM CHEST 1 VIEW: CPT

## 2023-01-01 PROCEDURE — 70450 CT HEAD/BRAIN W/O DYE: CPT

## 2023-01-01 PROCEDURE — 85027 COMPLETE CBC AUTOMATED: CPT | Performed by: EMERGENCY MEDICINE

## 2023-01-01 PROCEDURE — 72125 CT NECK SPINE W/O DYE: CPT

## 2023-01-01 PROCEDURE — 72100 X-RAY EXAM L-S SPINE 2/3 VWS: CPT

## 2023-01-01 PROCEDURE — 250N000013 HC RX MED GY IP 250 OP 250 PS 637: Performed by: PHYSICIAN ASSISTANT

## 2023-01-01 PROCEDURE — 85610 PROTHROMBIN TIME: CPT | Performed by: EMERGENCY MEDICINE

## 2023-01-01 PROCEDURE — 99283 EMERGENCY DEPT VISIT LOW MDM: CPT

## 2023-01-01 PROCEDURE — 85610 PROTHROMBIN TIME: CPT | Performed by: PHYSICIAN ASSISTANT

## 2023-01-01 PROCEDURE — 36415 COLL VENOUS BLD VENIPUNCTURE: CPT | Performed by: EMERGENCY MEDICINE

## 2023-01-01 PROCEDURE — 250N000011 HC RX IP 250 OP 636: Performed by: EMERGENCY MEDICINE

## 2023-01-01 PROCEDURE — 93005 ELECTROCARDIOGRAM TRACING: CPT | Performed by: EMERGENCY MEDICINE

## 2023-01-01 PROCEDURE — 80053 COMPREHEN METABOLIC PANEL: CPT | Performed by: EMERGENCY MEDICINE

## 2023-01-01 PROCEDURE — 96374 THER/PROPH/DIAG INJ IV PUSH: CPT

## 2023-01-01 RX ORDER — ACETAMINOPHEN 325 MG/1
975 TABLET ORAL ONCE
Status: COMPLETED | OUTPATIENT
Start: 2023-01-01 | End: 2023-01-01

## 2023-01-01 RX ORDER — CLONIDINE HYDROCHLORIDE 0.1 MG/1
0.1 TABLET ORAL ONCE
Status: COMPLETED | OUTPATIENT
Start: 2023-01-01 | End: 2023-01-01

## 2023-01-01 RX ORDER — OXYCODONE HYDROCHLORIDE 5 MG/1
5 TABLET ORAL ONCE
Status: COMPLETED | OUTPATIENT
Start: 2023-01-01 | End: 2023-01-01

## 2023-01-01 RX ORDER — OXYCODONE HYDROCHLORIDE 5 MG/1
5 TABLET ORAL EVERY 6 HOURS PRN
Qty: 6 TABLET | Refills: 0 | Status: SHIPPED | OUTPATIENT
Start: 2023-01-01 | End: 2023-01-01

## 2023-01-01 RX ORDER — LABETALOL HYDROCHLORIDE 5 MG/ML
20 INJECTION, SOLUTION INTRAVENOUS ONCE
Status: COMPLETED | OUTPATIENT
Start: 2023-01-01 | End: 2023-01-01

## 2023-01-01 RX ORDER — ACETAMINOPHEN 325 MG/1
650 TABLET ORAL ONCE
Status: COMPLETED | OUTPATIENT
Start: 2023-01-01 | End: 2023-01-01

## 2023-01-01 RX ADMIN — LABETALOL HYDROCHLORIDE 20 MG: 5 INJECTION, SOLUTION INTRAVENOUS at 01:46

## 2023-01-01 RX ADMIN — ACETAMINOPHEN 650 MG: 325 TABLET ORAL at 01:37

## 2023-01-01 RX ADMIN — OXYCODONE HYDROCHLORIDE 5 MG: 5 TABLET ORAL at 03:07

## 2023-01-01 RX ADMIN — ACETAMINOPHEN 975 MG: 325 TABLET ORAL at 01:45

## 2023-01-01 RX ADMIN — CLONIDINE HYDROCHLORIDE 0.1 MG: 0.1 TABLET ORAL at 16:02

## 2023-01-01 ASSESSMENT — ACTIVITIES OF DAILY LIVING (ADL)
ADLS_ACUITY_SCORE: 35
ADLS_ACUITY_SCORE: 37

## 2023-01-01 ASSESSMENT — ENCOUNTER SYMPTOMS
ARTHRALGIAS: 1
SHORTNESS OF BREATH: 1
BACK PAIN: 1
WOUND: 1
DIZZINESS: 0

## 2023-01-10 NOTE — ED TRIAGE NOTES
Patient presents via EMS, fall in his bathroom apartment this evening. Bilateral hip pain noted, was walking on scene per EMS. No LOC, on warfarin. Bruising noted to right arm as well.      Triage Assessment     Row Name 01/10/23 0009       Triage Assessment (Adult)    Airway WDL WDL       Respiratory WDL    Respiratory WDL WDL       Peripheral/Neurovascular WDL    Peripheral Neurovascular WDL WDL       Cognitive/Neuro/Behavioral WDL    Cognitive/Neuro/Behavioral WDL WDL

## 2023-01-10 NOTE — ED PROVIDER NOTES
EMERGENCY DEPARTMENT ENCOUNTER      NAME: Phi Christine  AGE: 87 year old male  YOB: 1935  MRN: [id number]  EVALUATION DATE & TIME: 1/10/2023 12:07 AM    PCP: Rio Menchaca    ED PROVIDER: Pedrito Pompa DO      Chief Complaint   Patient presents with     Hip Pain     Fall         FINAL IMPRESSION:  1. Fall in home, initial encounter    2. Acute bilateral low back pain without sciatica          ED COURSE & MEDICAL DECISION MAKIN-year-old male was brought into the ED by EMS following a mechanical fall.  Patient states that he slipped while in the bathroom earlier this evening.  The patient hit his head but he does not think that he lost consciousness.  Patient is on Coumadin.  The patient's primary complaint of pain was in his bilateral hips.  Patient had no other significant complaints of trauma or injury and he denied any other symptoms.  Upon arrival to the ED the patient was hypertensive but he was otherwise hemodynamically stable.  The patient did not appear to be in any obvious distress or discomfort.  The patient's physical exam was unremarkable.  There were no signs of trauma or injury and there was no reproducible tenderness palpation noted on exam.  Following his initial evaluation the patient was given a dose of oral Tylenol for pain control.       CBC was unremarkable.  The patient's INR was in the normal therapeutic range of 2.6.    CT scan of the head and cervical spine were both nondiagnostic.  X-rays of the chest, pelvis, and lumbar spine also did not show evidence of acute traumatic injury.    The patient was reevaluated and both he and his wife were informed of the reassuring imaging and lab results.  At this time the patient's low back pain is likely due to a ligamentous sprain or possibly a contusion.  The patient was able to ambulate to the bathroom with the help of a walker.  At the time of reevaluation the patient stated that he was still experiencing a fair amount  of low back pain despite receiving the Tylenol.  The patient was then given a dose of oxycodone for further pain control.    The patient was reevaluated after receiving the oxycodone.  The patient was now able to ambulate with less pain and difficulty.  The patient stated that he now felt comfortable returning home.  The patient was sent home with additional oxycodone to take as needed for any further pain.  The patient was instructed to use the oxycodone with caution since it can make you feel drowsy which could ultimately lead to further falls.  The patient was instructed to follow-up with his primary care provider for reevaluation or to return back to ED sooner for any worsening pain or any other new or concerning symptoms.      Pertinent Labs & Imaging studies reviewed. (See chart for details)  12:10 AM I met with the patient to gather history and to perform my initial exam. We discussed plans for the ED course, including diagnostic testing and treatment.   3:00 AM I rechecked and updated the patient.   5:05 AM I rechecked and updated the patient. He feels improved. We discussed the plan for discharge and the patient is agreeable. Reviewed supportive cares, symptomatic treatment, outpatient follow up, and reasons to return to the Emergency Department. Patient to be discharged by ED RN.     At the conclusion of the encounter I discussed the results of all of the tests and the disposition. The questions were answered. The patient or family acknowledged understanding and was agreeable with the care plan.     Medical Decision Making    History:    Supplemental history from: Documented in HPI, if applicable    Work Up:    Chart documentation includes differential considered and any EKGs or imaging independently interpreted by provider.      External consultation:    Discussion of management with another provider: See chart documentation, if applicable    Complicating factors:    Care impacted by chronic illness:  N/A    Care affected by social determinants of health: N/A    Disposition considerations: Discharge. I prescribed additional prescription strength medication(s) as charted. I considered admission, but discharged the patient after share decision making conversation.        PPE worn: n95 mask, goggles    MEDICATIONS GIVEN IN THE EMERGENCY:  Medications   acetaminophen (TYLENOL) tablet 650 mg (650 mg Oral Given 1/10/23 0137)   oxyCODONE (ROXICODONE) tablet 5 mg (5 mg Oral Given 1/10/23 0307)       NEW PRESCRIPTIONS STARTED AT TODAY'S ER VISIT  Discharge Medication List as of 1/10/2023  5:31 AM      START taking these medications    Details   oxyCODONE (ROXICODONE) 5 MG tablet Take 1 tablet (5 mg) by mouth every 6 hours as needed for severe pain (7-10), Disp-6 tablet, R-0, Local Print                =================================================================    HPI    Patient information was obtained from: patient     Use of : N/A        Phi Christine is a 87 year old male with a pertinent history of anemia, atrial flutter on coumadin, MI, CAD, CHF, stage 3 CKD, PE, prostate cancer, DM II, hyperlipidemia, hypertension, and obesity who presents to this ED by EMS for evaluation of hip and back pain following a mechanical fall.    Patient reports tripping and falling in his bathroom onto his back about 1 hour prior to ED arrival (~11 PM). He denies any dizziness prior to this fall. During the fall he hit his head on his soft toilet seat, but states he did not lose consciousness. After the fall he had trouble breathing, but says this has mostly resolved now. Patient presently endorses pain to his bilateral hips and lower back. His lower back pain is exacerbated with movement. He was able to walk to his bed following his fall using a walker which he typically uses. Patient is on warfarin. No other complaints or concerns expressed at this time.       REVIEW OF SYSTEMS   Review of Systems   Respiratory:  Positive for shortness of breath (resolved).    Musculoskeletal: Positive for arthralgias (bilateral hips) and back pain (lower).        Positive for mechanical fall   Neurological: Negative for dizziness and syncope.   All other systems reviewed and are negative.       PAST MEDICAL HISTORY:  Past Medical History:   Diagnosis Date     Accelerated hypertension      ACP (advance care planning) 1/16/2020     Actinic keratosis      Acute blood loss anemia      Acute blood loss as cause of postoperative anemia 4/22/2017     Acute kidney injury (H)      Adenocarcinoma of prostate (H)      Adenoma of sigmoid colon      Adenomatous polyp of sigmoid colon 3/6/2020     Age-related macular degeneration      KYLAH (acute kidney injury) (H) 2/8/2021     Anemia associated with acute blood loss      Anticoagulation goal of INR 2 to 3 4/2/2020     Anticoagulation monitoring, INR range 2-3 9/15/2020     ARMD (age related macular degeneration)      Aspirin intolerance 9/2/2016    Overview:  Bloody nose.     Atrial flutter (H)     on coumadin     Atrial flutter (H)      Borderline glaucoma with ocular hypertension      CAD (coronary artery disease)     s/p CABG (2004)     Cancer of sigmoid colon (H)      CHF (congestive heart failure) (H)      Chronic coronary artery disease 4/22/2017     Chronic diastolic heart failure (H) 1/15/2014     Chronic kidney disease      Chronic renal insufficiency, stage III (moderate) (H)      CKD (chronic kidney disease) stage 3, GFR 30-59 ml/min (H)      Closed fracture of three ribs of right side, initial encounter 9/26/2020     Colonic mass 1/17/2020     Coronary artery disease      Diabetes mellitus (H)     Type II     Diastolic heart failure (H)      DM2 (diabetes mellitus, type 2) (H)      Dyslipidemia      Dyspnea 7/17/2020     Encephalopathy 7/17/2020     Facial droop      Fall at home, initial encounter 9/24/2020     Foot drop, left foot      Glaucoma 6/22/2020     Gout      Gout      Hemothorax  on right      High cholesterol      History of coronary artery bypass surgery 4/22/2017     HLD (hyperlipidemia)      HTN (hypertension)      Hyperkalemia 4/17/2020     Hypertension      Iron deficiency anemia due to chronic blood loss 9/26/2020     Ischemic colitis (H)      Lower GI bleed      MI (myocardial infarction) (H)      Myocardial infarction (H)     16 years ago     Obesity      Obesity (BMI 30-39.9) 7/12/2013     Pleural effusion 10/6/2020     Primary osteoarthritis of right knee 4/20/2017     Prostate cancer (H)      Pseudophakia of both eyes      Pseudophakia, both eyes 8/12/2014     Radiation proctitis      Rectal bleeding      Renal insufficiency 7/12/2013     Routine history and physical examination of adult 2/8/2021    Overview:  65+yo Male Routine Health Maintenance (ICSI 2007 Guidelines) Hypertension and Obesity: see chart review   Depression: no Alcohol:  Aspirin prophylaxis: yes Osteoporosis risks and prevention assessed/discussed:  Vision:  Hearing:  Tobacco use/cessation (if indicated): no  Lipids: 1/9/1009 Colon CA:  AAA (if age 65-74 and have ever smoked 100+ cigarettes in lifetime):     Vaccines (Td/TDa     Type 2 diabetes mellitus (H) 10/20/2009    Overview:  a system change updated this record. This will not affect patient care or billing. This comment can be deleted.      Typical atrial flutter (H) 1/16/2020       PAST SURGICAL HISTORY:  Past Surgical History:   Procedure Laterality Date     BYPASS GRAFT ARTERY CORONARY  2001    CABG x4      CARDIAC SURGERY       COLONOSCOPY      x 2     Colostomy Reversal  06/22/2020     CORONARY ARTERY BYPASS  2004     EYE SURGERY      Cataract Removal Left      INTRAOCULAR LENS INSERTION      Kelman phacoemulsification clear corneal intraocular lens implant - Right      IR CHEST TUBE PLACEMENT NON-TUNNELED RIGHT  10/6/2020     IR PLEURAL DRAINAGE WITH CATHETER INSERTION  10/6/2020     JOINT REPLACEMENT Left     knee     LAPAROSCOPIC ASSISTED SIGMOID  COLECTOMY  03/05/2020     OTHER SURGICAL HISTORY      flexible sigmoidscopy      [facility] TOTAL KNEE ARTHROPLASTY Right 4/20/2017    Procedure: RIGHT TOTAL KNEE ARTHROPLASTY;  Surgeon: Kendrick Ramirez MD;  Location: [facility] Main OR;  Service: Orthopedics     [facility] COLONOSCOPY W/WO BRUSH/WASH N/A 1/28/2020    Procedure: COLONOSCOPY WITH TATTOO;  Surgeon: Raul Taylor MD;  Location: [facility] Main OR;  Service: Gastroenterology           CURRENT MEDICATIONS:    oxyCODONE (ROXICODONE) 5 MG tablet  allopurinol (ZYLOPRIM) 100 MG tablet  aspirin 81 MG EC tablet  atorvastatin (LIPITOR) 40 MG tablet  carvedilol (COREG) 12.5 MG tablet  Ferrous Sulfate 324 MG TBEC  glimepiride (AMARYL) 2 MG tablet  hydrALAZINE (APRESOLINE) 25 MG tablet  hydrALAZINE (APRESOLINE) 50 MG tablet  insulin glargine (BASAGLAR KWIKPEN) 100 UNIT/ML pen  LANsoprazole (PREVACID) 30 MG DR capsule  latanoprost (XALATAN) 0.005 % ophthalmic solution  levETIRAcetam (KEPPRA) 100 MG/ML solution  lisinopril (ZESTRIL) 40 MG tablet  nitroGLYcerin (NITROSTAT) 0.4 MG sublingual tablet  omeprazole (PRILOSEC) 20 MG DR capsule  predniSONE (DELTASONE) 20 MG tablet  Psyllium (FIBER) 28.3 % POWD  spironolactone (ALDACTONE) 25 MG tablet  sulfamethoxazole-trimethoprim (BACTRIM DS) 800-160 MG tablet  warfarin ANTICOAGULANT (COUMADIN) 1 MG tablet        ALLERGIES:  Allergies   Allergen Reactions     Metformin      Other reaction(s): Dizziness       FAMILY HISTORY:  Family History   Problem Relation Age of Onset     Colon Cancer Mother      Cerebrovascular Disease Father      Gout Father      Diabetes Brother      Coronary Artery Disease Brother      Diabetes Brother      Cancer Mother         Colon Cancer     Diabetes Brother      Heart Disease Brother        SOCIAL HISTORY:   Social History     Socioeconomic History     Marital status:      Spouse name: None     Number of children: None     Years of education: None     Highest education level: None   Tobacco  "Use     Smoking status: Former     Types: Cigarettes     Quit date: 1968     Years since quittin.0     Smokeless tobacco: Former   Substance and Sexual Activity     Alcohol use: Yes     Comment: Alcoholic Drinks/day: rare     Drug use: No       VITALS:  BP (!) 149/78   Pulse 99   Temp 98.9  F (37.2  C) (Oral)   Resp 18   Ht 1.803 m (5' 11\")   Wt 99.3 kg (219 lb)   SpO2 96%   BMI 30.54 kg/m      PHYSICAL EXAM    General Presentation: No apparent distress.  ENT: Nose atraumatic/non-tender. No septal hematoma. Face/mandible non-tender. Oropharynx clear.  Eye: Pupils equal round and reactive to light. EOMFI. No conjunctival hemorrhage. No hyphema. Eye lids normal.  Pulmonary: Spontaneous respiration. No respiratory distress. Clear equal breath sounds. Airway patent. Speech is normal. No stridor. Grossly stable chest wall. No crepitus. No chest wall tenderness to palpation noted.  Circulatory: Regular rate and rhythm. No murmurs, rubs, or gallops. Normal capillary refill.   Abdominal: Abdomen soft, non-tender and non-distended. No peritoneal signs. No flank tenderness. Normal bowel sounds. Pelvis stable/non-tender.   Neurologic: Alert and awake. Cranial nerves II-XII grossly intact.  No gross motor deficit. No gross sensory deficit. Normal upper extremity and lower extremity strength. Jaye Coma Score 15.  Spine: No bony tenderness to palpation in the cervical spine, thoracic spine, lumbar spine, or sacrum.     Upper extremities:  Full range of motion. No tenderness to palpation.  Pulses 2/4 bilateral upper extremities . No wounds, abrasions, lacerations, or contusions noted.   Lower extremities:  Full range of motion. No tenderness to palpation.  Pulses 2/4 bilateral lower extremities . No wounds, abrasions, lacerations, or contusions noted.   Skin: Head/scalp non tender. No wounds, abrasions, lacerations, or contusions of head/scalp, chest, back, abdomen, flank or pelvis.         LAB:  All pertinent " labs reviewed and interpreted.  Results for orders placed or performed during the hospital encounter of 01/10/23   Head CT w/o contrast    Impression    IMPRESSION:  HEAD CT:  1.  No CT evidence for acute intracranial process.  2.  Chronic ischemic changes, as above.  3.  Brain atrophy and presumed chronic microvascular ischemic changes as above.    CERVICAL SPINE CT:  1.  No CT evidence for acute fracture or post traumatic subluxation.  2.  Multilevel degenerative changes, as above, with up to moderate to severe central spinal canal and neural foraminal stenosis.   Cervical spine CT w/o contrast    Impression    IMPRESSION:  HEAD CT:  1.  No CT evidence for acute intracranial process.  2.  Chronic ischemic changes, as above.  3.  Brain atrophy and presumed chronic microvascular ischemic changes as above.    CERVICAL SPINE CT:  1.  No CT evidence for acute fracture or post traumatic subluxation.  2.  Multilevel degenerative changes, as above, with up to moderate to severe central spinal canal and neural foraminal stenosis.   Lumbar spine XR, 2-3 views    Impression    IMPRESSION:   No radiographic acute fracture. Vertebral heights maintained. No significant subluxations. Bilateral sacroiliac joints intact.    Multilevel spondylosis. Possible diffuse bony mineralization. Bowel gas overlies the sacrum limiting assessment. Bilateral right greater than left SI degenerative joint disease.    Vascular calcifications. Postop clips project over the right pubic tubercle.   XR Pelvis and Hip Right 2 Views    Impression    IMPRESSION: Diffuse osteopenia limits detail. Moderate hypertrophic changes most pronounced in the lower lumbar spine. Surgical clips project over the lower aspect of the pelvis just to the right of midline. Advanced scattered vascular calcification. The   Pelvis and right hip are otherwise normal, specifically I do not see any obvious acute fracture or changes of dislocation.   XR Chest 1 View    Impression     IMPRESSION: Since 02/21/2022 there is some new slight linear atelectasis in the left lung base. There is less atelectasis seen in the right lung base. Otherwise the chest is stable. Again seen are findings of a sternotomy. Borderline heart size with   normal pulmonary vascularity. Mildly calcified thoracic aortic arch. No acute traumatic abnormalities are seen.   Result Value Ref Range    INR 2.64 (H) 0.85 - 1.15   CBC (+ platelets, no diff)   Result Value Ref Range    WBC Count 7.7 4.0 - 11.0 10e3/uL    RBC Count 3.96 (L) 4.40 - 5.90 10e6/uL    Hemoglobin 12.2 (L) 13.3 - 17.7 g/dL    Hematocrit 38.3 (L) 40.0 - 53.0 %    MCV 97 78 - 100 fL    MCH 30.8 26.5 - 33.0 pg    MCHC 31.9 31.5 - 36.5 g/dL    RDW 15.6 (H) 10.0 - 15.0 %    Platelet Count 178 150 - 450 10e3/uL       RADIOLOGY:  Reviewed all pertinent imaging. Please see official radiology report.  XR Chest 1 View   Final Result   IMPRESSION: Since 02/21/2022 there is some new slight linear atelectasis in the left lung base. There is less atelectasis seen in the right lung base. Otherwise the chest is stable. Again seen are findings of a sternotomy. Borderline heart size with    normal pulmonary vascularity. Mildly calcified thoracic aortic arch. No acute traumatic abnormalities are seen.      Lumbar spine XR, 2-3 views   Final Result   IMPRESSION:    No radiographic acute fracture. Vertebral heights maintained. No significant subluxations. Bilateral sacroiliac joints intact.      Multilevel spondylosis. Possible diffuse bony mineralization. Bowel gas overlies the sacrum limiting assessment. Bilateral right greater than left SI degenerative joint disease.      Vascular calcifications. Postop clips project over the right pubic tubercle.      XR Pelvis and Hip Right 2 Views   Final Result   IMPRESSION: Diffuse osteopenia limits detail. Moderate hypertrophic changes most pronounced in the lower lumbar spine. Surgical clips project over the lower aspect of the  pelvis just to the right of midline. Advanced scattered vascular calcification. The    Pelvis and right hip are otherwise normal, specifically I do not see any obvious acute fracture or changes of dislocation.      Head CT w/o contrast   Final Result   IMPRESSION:   HEAD CT:   1.  No CT evidence for acute intracranial process.   2.  Chronic ischemic changes, as above.   3.  Brain atrophy and presumed chronic microvascular ischemic changes as above.      CERVICAL SPINE CT:   1.  No CT evidence for acute fracture or post traumatic subluxation.   2.  Multilevel degenerative changes, as above, with up to moderate to severe central spinal canal and neural foraminal stenosis.      Cervical spine CT w/o contrast   Final Result   IMPRESSION:   HEAD CT:   1.  No CT evidence for acute intracranial process.   2.  Chronic ischemic changes, as above.   3.  Brain atrophy and presumed chronic microvascular ischemic changes as above.      CERVICAL SPINE CT:   1.  No CT evidence for acute fracture or post traumatic subluxation.   2.  Multilevel degenerative changes, as above, with up to moderate to severe central spinal canal and neural foraminal stenosis.            I, Kristy Taylor , am serving as a scribe to document services personally performed by Pedrito Pompa DO based on my observation and the provider's statements to me. I, Pedrito Pompa, attest that Kristy Taylor is acting in a scribe capacity, has observed my performance of the services and has documented them in accordance with my direction.    Pedrito Pompa DO  Emergency Medicine  [facility] EMERGENCY DEPARTMENT  [address] [place] [id number]  [phone]     Pedrito Pompa DO  01/10/23 0643

## 2023-01-10 NOTE — ED NOTES
Bed: JNED-B  Expected date: 1/10/23  Expected time:   Means of arrival:   Comments:  Mika  87 year old male  Fall on thinners  NO LOC, did not hit head

## 2023-01-10 NOTE — DISCHARGE INSTRUCTIONS
All of the imaging studies and laboratory tests appear reassuring here today in the ED.  Low back pain is likely due to a ligamentous strain.    Take the prescribed oxycodone as needed for any further pain over the next few days.  Apply ice to the affected areas as needed every few hours for the next 2 to 3 days.    Follow-up with your primary care provider for reevaluation.  Return back to ED sooner for any worsening pain or any other new or concerning symptoms.

## 2023-01-10 NOTE — ED NOTES
Pt helped to BR with assist of one, using walker. Pt able to walk slowly with walker, Has pain to left mid back.

## 2023-03-03 NOTE — ED NOTES
Patient remains hypertensive.  Denies any headache, dizziness, or CP.  States takes anti-hypertensives at home- TID.  Has already had 2 doses today.  Jaelyn Bruce PA-C made aware.

## 2023-03-03 NOTE — ED PROVIDER NOTES
Emergency Department Encounter   NAME: Phi Christine ; AGE: 87 year old male ; YOB: 1935 ; MRN: [id number] ; PCP: [facility]   ED PROVIDER: Moni Bruce PA-C    Evaluation Date & Time:   3/3/2023  3:19 PM    CHIEF COMPLAINT:  Laceration      Impression and Plan   MDM:   Phi Christine is a 87 year old male with a pertinent history of HTN, anemia, atrial flutter, chronic anticoagulation, chronic diastolic heart failure, dyslipidemia, obesity, CKD III, DM II, dysphagia, who presents to the ED by private vehicle with wife for evaluation of foot wound.  The patient presents to the emergency department for evaluation of a wound to the plantar surface of his right foot.  He has had a chronic callus over his great MCP, and wife attempted to treat it with an over-the-counter medicated callus patch which she placed yesterday, however when she attempted to remove it today, skin tore with it causing bleeding and as patient is on Coumadin, they were unable to stop the bleeding at home prompting her visit to the ER.  Here in the ED, he is very pleasant and well-appearing, although is quite hypertensive with a blood pressure of 226/117 otherwise vitally stable.  On exam, he does have a quarter sized callus with a 2 cm laceration extending into the callus.  There is no bleeding at this time after continued direct pressure.  No brisk or pulsatile bleeding described concerning for arterial bleed.  Distal CMS is intact.  Wound was anesthetized with lidocaine with epinephrine which she tolerated well, and 3 interrupted Vicryl Rapide sutures were placed with closure no return of bleeding.  Wound was cleaned, and new dressings placed.  We discussed monitoring for signs of infection, staying off of the foot for the next week and elevating when able to help this heal, and reasons to return to the ER, and he verbalized understanding.  Will check his INR as he was supratherapeutic above 3 at his last INR visit  last week, however did have a change in his dosing.  INR returned at 2.8 - within normal range. He has a known history of hypertension and is on multiple antihypertensives, however also states that this is complicated by whitecoat hypertension and his blood pressures are frequently elevated in the ER.  We will give him a small dose of clonidine and monitor. As long as BP is down trending, he can discharge to home.  He is not having any symptoms of hypertension or evidence of endorgan dysfunction.  No concern for hypertensive emergency.      ED COURSE:  3:05 PM I met and introduced myself to the patient. I gathered initial history and performed my physical exam. We discussed plan for laceration.   3:30 PM I performed laceration repair. Plan for INR and BP check.   4:06 PM INR wnl. We discussed discharge, follow up and reasons to return to the ED. Will monitor BP.     At the conclusion of the encounter I discussed the results of all the tests and the disposition. The questions were answered. The patient or family acknowledged understanding and was agreeable with the care plan.    FINAL IMPRESSION:    ICD-10-CM    1. Laceration of right foot, initial encounter  S91.311A       2. Chronic anticoagulation  Z79.01             MEDICATIONS GIVEN IN THE EMERGENCY DEPARTMENT:  Medications - No data to display      NEW PRESCRIPTIONS STARTED AT TODAY'S ED VISIT:  New Prescriptions    No medications on file         HPI   Patient information was obtained from: Patient and Wife   Use of Intrepreter: N/A    Phi Christine is a 87 year old male with a pertinent history of HTN, anemia, atrial flutter, chronic anticoagulation, chronic diastolic heart failure, dyslipidemia, obesity, CKD III, DM II, dysphagia, who presents to the ED by private vehicle with wife for evaluation of foot wound.     Per patient, he has had a chronic callus on the bottom of his right foot.  Wife placed a topical callus over-the-counter medicated patch on the  wound last night and placed a dressing over it with tape.  She attempted to move the patch for him this afternoon, and when she pulled the patch off, she tore some skin and he had immediate bleeding.  Wife reports he does have fragile skin and he is also chronically anticoagulated on Coumadin.  They attempted to stop the bleeding with direct pressure at home but were unable to do so, prompting their visit to the ER.  His last tetanus was in 2016.  He uses a walker to ambulate at baseline.  He does have a history of hypertension and is taking his antihypertensives as prescribed, however per wife he also has whitecoat hypertension and blood pressures typically improve throughout clinic visits.      REVIEW OF SYSTEMS:  Review of Systems   Skin: Positive for wound (right foot).        Positive for uncontrollable bleeding from foot wound.          Medical History     Past Medical History:   Diagnosis Date     Accelerated hypertension      ACP (advance care planning) 1/16/2020     Actinic keratosis      Acute blood loss anemia      Acute blood loss as cause of postoperative anemia 4/22/2017     Acute kidney injury (H)      Adenocarcinoma of prostate (H)      Adenoma of sigmoid colon      Adenomatous polyp of sigmoid colon 3/6/2020     Age-related macular degeneration      KYLAH (acute kidney injury) (H) 2/8/2021     Anemia associated with acute blood loss      Anticoagulation goal of INR 2 to 3 4/2/2020     Anticoagulation monitoring, INR range 2-3 9/15/2020     ARMD (age related macular degeneration)      Aspirin intolerance 9/2/2016     Atrial flutter (H)      Atrial flutter (H)      Borderline glaucoma with ocular hypertension      CAD (coronary artery disease)      Cancer of sigmoid colon (H)      CHF (congestive heart failure) (H)      Chronic coronary artery disease 4/22/2017     Chronic diastolic heart failure (H) 1/15/2014     Chronic kidney disease      Chronic renal insufficiency, stage III (moderate) (H)      CKD  (chronic kidney disease) stage 3, GFR 30-59 ml/min (H)      Closed fracture of three ribs of right side, initial encounter 9/26/2020     Colonic mass 1/17/2020     Coronary artery disease      Diabetes mellitus (H)      Diastolic heart failure (H)      DM2 (diabetes mellitus, type 2) (H)      Dyslipidemia      Dyspnea 7/17/2020     Encephalopathy 7/17/2020     Facial droop      Fall at home, initial encounter 9/24/2020     Foot drop, left foot      Glaucoma 6/22/2020     Gout      Gout      Hemothorax on right      High cholesterol      History of coronary artery bypass surgery 4/22/2017     HLD (hyperlipidemia)      HTN (hypertension)      Hyperkalemia 4/17/2020     Hypertension      Iron deficiency anemia due to chronic blood loss 9/26/2020     Ischemic colitis (H)      Lower GI bleed      MI (myocardial infarction) (H)      Myocardial infarction (H)      Obesity      Obesity (BMI 30-39.9) 7/12/2013     Pleural effusion 10/6/2020     Primary osteoarthritis of right knee 4/20/2017     Prostate cancer (H)      Pseudophakia of both eyes      Pseudophakia, both eyes 8/12/2014     Radiation proctitis      Rectal bleeding      Renal insufficiency 7/12/2013     Routine history and physical examination of adult 2/8/2021     Type 2 diabetes mellitus (H) 10/20/2009     Typical atrial flutter (H) 1/16/2020       Past Surgical History:   Procedure Laterality Date     BYPASS GRAFT ARTERY CORONARY  2001    CABG x4      CARDIAC SURGERY       COLONOSCOPY      x 2     Colostomy Reversal  06/22/2020     CORONARY ARTERY BYPASS  2004     EYE SURGERY      Cataract Removal Left      INTRAOCULAR LENS INSERTION      Kelman phacoemulsification clear corneal intraocular lens implant - Right      IR CHEST TUBE PLACEMENT NON-TUNNELED RIGHT  10/6/2020     IR PLEURAL DRAINAGE WITH CATHETER INSERTION  10/6/2020     JOINT REPLACEMENT Left     knee     LAPAROSCOPIC ASSISTED SIGMOID COLECTOMY  03/05/2020     OTHER SURGICAL HISTORY      flexible  "sigmoidscopy      [facility] TOTAL KNEE ARTHROPLASTY Right 2017    Procedure: RIGHT TOTAL KNEE ARTHROPLASTY;  Surgeon: Kendrick Ramierz MD;  Location: [facility] Main OR;  Service: Orthopedics     [facility] COLONOSCOPY W/WO BRUSH/WASH N/A 2020    Procedure: COLONOSCOPY WITH TATTOO;  Surgeon: Raul Taylor MD;  Location: [facility] Main OR;  Service: Gastroenterology       Family History   Problem Relation Age of Onset     Colon Cancer Mother      Cerebrovascular Disease Father      Gout Father      Diabetes Brother      Coronary Artery Disease Brother      Diabetes Brother      Cancer Mother         Colon Cancer     Diabetes Brother      Heart Disease Brother        Social History     Tobacco Use     Smoking status: Former     Types: Cigarettes     Quit date: 1968     Years since quittin.2     Smokeless tobacco: Former   Substance Use Topics     Alcohol use: Yes     Comment: Alcoholic Drinks/day: rare     Drug use: No       allopurinol (ZYLOPRIM) 100 MG tablet  aspirin 81 MG EC tablet  atorvastatin (LIPITOR) 40 MG tablet  carvedilol (COREG) 12.5 MG tablet  Ferrous Sulfate 324 MG TBEC  glimepiride (AMARYL) 2 MG tablet  hydrALAZINE (APRESOLINE) 25 MG tablet  hydrALAZINE (APRESOLINE) 50 MG tablet  insulin glargine (BASAGLAR KWIKPEN) 100 UNIT/ML pen  LANsoprazole (PREVACID) 30 MG DR capsule  latanoprost (XALATAN) 0.005 % ophthalmic solution  levETIRAcetam (KEPPRA) 100 MG/ML solution  lisinopril (ZESTRIL) 40 MG tablet  nitroGLYcerin (NITROSTAT) 0.4 MG sublingual tablet  omeprazole (PRILOSEC) 20 MG DR capsule  predniSONE (DELTASONE) 20 MG tablet  Psyllium (FIBER) 28.3 % POWD  spironolactone (ALDACTONE) 25 MG tablet  sulfamethoxazole-trimethoprim (BACTRIM DS) 800-160 MG tablet  warfarin ANTICOAGULANT (COUMADIN) 1 MG tablet          Physical Exam     First Vitals:  Patient Vitals for the past 24 hrs:   BP Temp Pulse Resp SpO2 Height Weight   23 1459 (!) 226/117 98  F (36.7  C) 77 20 97 % 1.727 m (5' 8\") " 100.7 kg (222 lb)         PHYSICAL EXAM:   Physical Exam  Vitals and nursing note reviewed.   Constitutional:       General: He is not in acute distress.     Appearance: He is not toxic-appearing.   Musculoskeletal:      Comments: Large quarter sized callous to the plantar surface of the right foot overlying MTP. There is a 2 cm laceration that extends into the callous. No active bleeding at this time. No FB in wound. 2+ DP and PT pulses and distal cap refill less than 2 seconds.    Neurological:      Mental Status: He is alert.         Results     LAB:  All pertinent labs reviewed and interpreted  Labs Ordered and Resulted from Time of ED Arrival to Time of ED Departure - No data to display    RADIOLOGY:  No orders to display     PROCEDURES:  PROCEDURE: Laceration Repair   INDICATIONS: Laceration   PROCEDURE PROVIDER: Jaelyn Bruce PA-C   SITE: Plantar surface of right foot    TYPE/SIZE: simple, clean and no foreign body visualized  2 cm (total length)   FUNCTIONAL ASSESSMENT: Distal sensation, circulation and motor intact   MEDICATION: 3 mLs of 1% Lidocaine with epinephrine   PREPARATION: scrubbing and irrigation with Sterile water   DEBRIDEMENT: no debridement and wound explored, no foreign body found   CLOSURE:  Superficial layer closed with 3 stitches of 4-0 Vycril Rapide simple interrupted    Total number of sutures/staples placed: 3           Moni Bruce PA-C   Emergency Medicine   [facility] EMERGENCY DEPARTMENT       Moni Bruce PA-C  03/03/23 [id number]

## 2023-03-03 NOTE — DISCHARGE INSTRUCTIONS
As we discussed, we have placed 3 absorbable stitches in your right foot wound.  I would like you to have a wound check in your primary care clinic in 1 to 2 weeks to make sure this is healing appropriately.  I would advise that you stay off of the foot is much as possible for the next week, elevate the foot above heart level when able, and do not participate in any excessive walking or activities for the next week.  If it anytime you notice signs of infection including increased pain, redness, swelling, or puslike discharge, please return to the ER for further evaluation.  You may use topical bacitracin and a clean dressing daily to prevent this.  If you have any return of heavy bleeding that is not subsiding with direct pressure, please also return to the ER.    Your blood pressure was elevated in the emergencydepartment today and requires recheck and close follow-up in your primary care clinic. Untreated blood pressure can cause serious complications including, but not limited to stroke, heart attack/failure, and kidney disease.  Your INR was 2.8 today.  Please make a close follow-up appointment to have this recheck performed. Please return to the emergency department immediately if you develop a severe headache, vision changes, chest pain, shortness of breath, orabdominal pain.

## 2023-05-22 NOTE — ED NOTES
Writer ambulated Pt. Pt was able to get up and ambulate down the palma needing no assistance. Gait was slow, but steady with a walker. Pt feels like he is strong enough and safe to go home. Discussed with wife and she feels comfortable taking him home too.

## 2023-05-22 NOTE — ED PROVIDER NOTES
EMERGENCY DEPARTMENT ENCOUNTER      NAME: Phi Christine  AGE: 87 year old male  YOB: 1935  MRN: [id number]  EVALUATION DATE & TIME: 5/22/2023 12:47 AM    PCP: Rio Menchaca    ED PROVIDER: Lolita Vicente MD    Chief Complaint   Patient presents with     Fall     Covid Positive          FINAL IMPRESSION:  1. Infection due to 2019 novel coronavirus    2. Fever, unspecified fever cause    3. Generalized muscle weakness    4. Fall, initial encounter          ED COURSE & MEDICAL DECISION MAKING:    Pertinent Labs & Imaging studies reviewed. (See chart for details)  87 year old male with history of CAD, CHF, HTN, HLD, DM, A-fib/flutter anticoagulated on Coumadin, CKD who presents to the Emergency Department for evaluation of generalized weakness after fall.  Patient describes fall as mechanical, legs giving out underneath him.  No report of syncope.  He denies hitting his head, but seems how he is anticoagulated on Coumadin will obtain neuroimaging.  Additionally patient does have some bruising to the chin so I think he actually did hit his head.  No midline tenderness palpation of the neck or back, but given age will obtain imaging of the cervical spine to rule out occult fracture given likely some degree of underlying osteopenia/osteoporosis.  I suspect the patient is feeling generally weak from his active viral syndrome/COVID infection.  He does have low-grade fever of 100.7 here.  May be secondary pneumonia.  He does not however describe much for purulent sputum, and he is not hypoxic nor tachycardic here though he is minimally tachypneic in the 20s.  Concern for dehydration, electrolyte abnormality, symptomatic anemia, arrhythmia.    Patient placed on monitor, IV established and blood obtained.  Given Tylenol for fever.  Twelve-lead EKG shows atrial flutter with 3-1 block.  CBC, CMP, INR notable for INR therapeutic at 2.41.  Chest x-ray obtained, unremarkable for acute abnormalities.  CT head,  cervical spine negative for acute abnormalities.  Patient given trial of ambulation and frankly to my surprise ambulated with his walker was quite well, needed 0 assistance.  Patient wants to go home and based on what I am seeing he is safe to be discharged home.  I do think his weakness is likely due to the COVID infection, but there is no signs of secondary pneumonia and his lab work is quite reassuring.  Patient will be discharged home, warning signs return to ED discussed.       ED Course as of 05/22/23 0340   Mon May 22, 2023   0119 Temp(!): 100.7  F (38.2  C)   0120 Met with the patient, obtained history, performed an initial exam, and discussed options and plan for diagnostics and treatment here in the ED. PPE worn including N95 mask, surgical gloves, eye protection.   0203 INR(!): 2.41   0219 Head CT w/o contrast  CT head reviewed by myself.  No visualized ICH.   0326 Patient passed his trial of ambulation and would like to be discharged.       Medical Decision Making    History:    Supplemental history from: EMS    External Record(s) reviewed: 2/14/2023 PCP visit    Work Up:    Chart documentation includes differential considered and any EKGs or imaging independently interpreted by provider, where specified.    In additional to work up documented, I considered the following work up: See MDM    External consultation:    Discussion of management with another provider: na    Complicating factors:    Care impacted by chronic illness: Chronic Kidney Disease, Heart Disease, Hyperlipidemia and Hypertension    Care affected by social determinants of health: Access to Medical Care night shift no access to PCP    Disposition considerations: Discharge. No recommendations on prescription strength medication(s). N/A.        At the conclusion of the encounter I discussed the results of all of the tests and the disposition. The questions were answered. The patient or family acknowledged understanding and was agreeable  with the care plan.    MEDICATIONS GIVEN IN THE EMERGENCY:  Medications   sodium chloride (PF) 0.9% PF flush 3 mL (has no administration in time range)   sodium chloride (PF) 0.9% PF flush 3 mL (3 mLs Intracatheter $Given 5/22/23 0147)   acetaminophen (TYLENOL) tablet 975 mg (975 mg Oral $Given 5/22/23 0145)   labetalol (NORMODYNE/TRANDATE) injection 20 mg (20 mg Intravenous $Given 5/22/23 0146)       NEW PRESCRIPTIONS STARTED AT TODAY'S ER VISIT  New Prescriptions    No medications on file          =================================================================    HPI    Patient information was obtained from: Patient    Use of Intrepreter: N/A       Phi Christine is a 87 year old male with pertinent medical history of chronic diastolic HF, diabetes mellitus type 2, hyperlipidemia, hypertension, prior MI, s/p CABG, CKD 3 who presents by EMS for the evaluation of fall, generalized weakness. Patient comes from his independent senior living building. Patient states he has been sick with a dry, nonproductive cough for the past 2 weeks with associated generalized weakness and fatigue. Yesterday, he tested positive for COVID-19.    Earlier today, patient states he got up to use the bathroom when his legs felt weak and gave out from under him, causing him to fall. Patient states he landed on the right side of his body. He denies hitting his head. Afterwards, he was too weak to get up from the ground and called EMS for a lift assist. He was then brought into the ED for further evaluation.    No loss of consciousness. No reported fevers, rhinorrhea, sore throat, headaches, midline neck pain or back pain. No changes to bowel or bladder habits.      PAST MEDICAL HISTORY:  Past Medical History:   Diagnosis Date     Accelerated hypertension      ACP (advance care planning) 1/16/2020     Actinic keratosis      Acute blood loss anemia      Acute blood loss as cause of postoperative anemia 4/22/2017     Acute kidney injury (H)       Adenocarcinoma of prostate (H)      Adenoma of sigmoid colon      Adenomatous polyp of sigmoid colon 3/6/2020     Age-related macular degeneration      KYLAH (acute kidney injury) (H) 2/8/2021     Anemia associated with acute blood loss      Anticoagulation goal of INR 2 to 3 4/2/2020     Anticoagulation monitoring, INR range 2-3 9/15/2020     ARMD (age related macular degeneration)      Aspirin intolerance 9/2/2016    Overview:  Bloody nose.     Atrial flutter (H)     on coumadin     Atrial flutter (H)      Borderline glaucoma with ocular hypertension      CAD (coronary artery disease)     s/p CABG (2004)     Cancer of sigmoid colon (H)      CHF (congestive heart failure) (H)      Chronic coronary artery disease 4/22/2017     Chronic diastolic heart failure (H) 1/15/2014     Chronic kidney disease      Chronic renal insufficiency, stage III (moderate) (H)      CKD (chronic kidney disease) stage 3, GFR 30-59 ml/min (H)      Closed fracture of three ribs of right side, initial encounter 9/26/2020     Colonic mass 1/17/2020     Coronary artery disease      Diabetes mellitus (H)     Type II     Diastolic heart failure (H)      DM2 (diabetes mellitus, type 2) (H)      Dyslipidemia      Dyspnea 7/17/2020     Encephalopathy 7/17/2020     Facial droop      Fall at home, initial encounter 9/24/2020     Foot drop, left foot      Glaucoma 6/22/2020     Gout      Gout      Hemothorax on right      High cholesterol      History of coronary artery bypass surgery 4/22/2017     HLD (hyperlipidemia)      HTN (hypertension)      Hyperkalemia 4/17/2020     Hypertension      Iron deficiency anemia due to chronic blood loss 9/26/2020     Ischemic colitis (H)      Lower GI bleed      MI (myocardial infarction) (H)      Myocardial infarction (H)     16 years ago     Obesity      Obesity (BMI 30-39.9) 7/12/2013     Pleural effusion 10/6/2020     Primary osteoarthritis of right knee 4/20/2017     Prostate cancer (H)      Pseudophakia of  both eyes      Pseudophakia, both eyes 8/12/2014     Radiation proctitis      Rectal bleeding      Renal insufficiency 7/12/2013     Routine history and physical examination of adult 2/8/2021    Overview:  65+yo Male Routine Health Maintenance (ICSI 2007 Guidelines) Hypertension and Obesity: see chart review   Depression: no Alcohol:  Aspirin prophylaxis: yes Osteoporosis risks and prevention assessed/discussed:  Vision:  Hearing:  Tobacco use/cessation (if indicated): no  Lipids: 1/9/1009 Colon CA:  AAA (if age 65-74 and have ever smoked 100+ cigarettes in lifetime):     Vaccines (Td/TDa     Type 2 diabetes mellitus (H) 10/20/2009    Overview:  a system change updated this record. This will not affect patient care or billing. This comment can be deleted.      Typical atrial flutter (H) 1/16/2020       PAST SURGICAL HISTORY:  Past Surgical History:   Procedure Laterality Date     BYPASS GRAFT ARTERY CORONARY  2001    CABG x4      CARDIAC SURGERY       COLONOSCOPY      x 2     Colostomy Reversal  06/22/2020     CORONARY ARTERY BYPASS  2004     EYE SURGERY      Cataract Removal Left      INTRAOCULAR LENS INSERTION      Kelman phacoemulsification clear corneal intraocular lens implant - Right      IR CHEST TUBE PLACEMENT NON-TUNNELED RIGHT  10/6/2020     IR PLEURAL DRAINAGE WITH CATHETER INSERTION  10/6/2020     JOINT REPLACEMENT Left     knee     LAPAROSCOPIC ASSISTED SIGMOID COLECTOMY  03/05/2020     OTHER SURGICAL HISTORY      flexible sigmoidscopy      [facility] TOTAL KNEE ARTHROPLASTY Right 4/20/2017    Procedure: RIGHT TOTAL KNEE ARTHROPLASTY;  Surgeon: Kendrick Ramirez MD;  Location: [facility];  Service: Orthopedics     [facility] COLONOSCOPY W/WO BRUSH/WASH N/A 1/28/2020    Procedure: COLONOSCOPY WITH TATTOO;  Surgeon: Raul Taylor MD;  Location: [facility];  Service: Gastroenterology       CURRENT MEDICATIONS:    Prior to Admission Medications   Prescriptions Last Dose Informant Patient Reported?  Taking?   Ferrous Sulfate 324 MG TBEC   Yes No   Sig: Take 324 mg by mouth   LANsoprazole (PREVACID) 30 MG DR capsule   Yes No   Si mg by Per G Tube route daily   Psyllium (FIBER) 28.3 % POWD   Yes No   Sig: Take 1 packet by mouth   allopurinol (ZYLOPRIM) 100 MG tablet   Yes No   Sig: Take 100 mg by mouth daily   aspirin 81 MG EC tablet   Yes No   Sig: Take 81 mg by mouth daily   atorvastatin (LIPITOR) 40 MG tablet   Yes No   Sig: Take 40 mg by mouth daily   carvedilol (COREG) 12.5 MG tablet   Yes No   Sig: Take 1.5 tablets by mouth 2 times daily   glimepiride (AMARYL) 2 MG tablet   Yes No   Sig: Take 2 mg by mouth daily   hydrALAZINE (APRESOLINE) 25 MG tablet   Yes No   Sig: TAKE 1 TABLET BY MOUTH THREE TIMES DAILY   hydrALAZINE (APRESOLINE) 50 MG tablet   Yes No   Sig: Take 50 mg by mouth   insulin glargine (BASAGLAR KWIKPEN) 100 UNIT/ML pen   Yes No   Sig: Inject 8 Units Subcutaneous daily   latanoprost (XALATAN) 0.005 % ophthalmic solution   Yes No   Sig: Apply 1 drop to eye At Bedtime   levETIRAcetam (KEPPRA) 100 MG/ML solution   Yes No   Sig: Take 1,000 mg by mouth 2 times daily   lisinopril (ZESTRIL) 40 MG tablet   Yes No   Sig: Take 40 mg by mouth daily   nitroGLYcerin (NITROSTAT) 0.4 MG sublingual tablet   Yes No   Sig: Place 0.4 mg under the tongue every 5 minutes as needed   omeprazole (PRILOSEC) 20 MG DR capsule   Yes No   Sig: TAKE 1 CAPSULE BY MOUTH BEFORE BREAKFAST   predniSONE (DELTASONE) 20 MG tablet   Yes No   Sig: Take 60 mg by mouth daily   spironolactone (ALDACTONE) 25 MG tablet   Yes No   Sig: Take 12.5 mg by mouth   sulfamethoxazole-trimethoprim (BACTRIM DS) 800-160 MG tablet   Yes No   Sig: Take 1 tablet by mouth daily   warfarin ANTICOAGULANT (COUMADIN) 1 MG tablet   Yes No   Sig: Take 1.5 mg by mouth daily (or as directed by INR Clinic)      Facility-Administered Medications: None       ALLERGIES:  Allergies   Allergen Reactions     Metformin      Other reaction(s): Dizziness  "      FAMILY HISTORY:  Family History   Problem Relation Age of Onset     Colon Cancer Mother      Cerebrovascular Disease Father      Gout Father      Diabetes Brother      Coronary Artery Disease Brother      Diabetes Brother      Cancer Mother         Colon Cancer     Diabetes Brother      Heart Disease Brother        SOCIAL HISTORY:  Social History     Tobacco Use     Smoking status: Former     Types: Cigarettes     Quit date: 1968     Years since quittin.4     Smokeless tobacco: Former   Substance Use Topics     Alcohol use: Yes     Comment: Alcoholic Drinks/day: rare     Drug use: No        VITALS:  Patient Vitals for the past 24 hrs:   BP Temp Temp src Pulse Resp SpO2 Height Weight   23 0230 (!) 186/85 -- -- 84 (!) 31 92 % -- --   23 0220 (!) 203/88 -- -- 83 (!) 34 93 % -- --   23 0111 (!) 211/139 -- -- 89 -- 93 % -- --   23 0055 (!) 216/96 (!) 100.7  F (38.2  C) Oral 92 24 93 % 1.727 m (5' 8\") 100.7 kg (222 lb)       PHYSICAL EXAM    General Appearance: Chronically ill-appearing elderly male in no acute distress, low grade fever  Head:  Normocephalic, atraumatic  Eyes:  PERRL, conjunctiva/corneas clear, EOM's intact  ENT:  Lips, mucosa, and tongue normal; membranes are moist without pallor, bruising and superficial laceration to the chin  Neck:  Supple, s no midline tenderness palpation  Cardio:  Regular rate and rhythm, no murmur/gallop/rub, hypertensive  Pulm:  No respiratory distress, clear to auscultation bilaterally, low normal saturations at 93%.  Back:  No midline tenderness to palpation, no paraspinal tenderness  Abdomen:  Soft, non-tender, non distended, obese  Extremities:  Small contusion to the left knee, but no pain with ROM of the extremities. 1+ edema with venous stasis changes to the shins bilaterally.  Skin:  Skin warm, dry, no rashes  Neuro:  Alert and oriented ×3, moving all extremities     RADIOLOGY/LABS:  Reviewed all pertinent imaging. Please see " official radiology report. All pertinent labs reviewed and interpreted.    Results for orders placed or performed during the hospital encounter of 05/22/23   Head CT w/o contrast    Impression    IMPRESSION:  HEAD CT:  1.  No acute intracranial hemorrhage or calvarial fracture.  2.  Small zones of encephalomalacia in the left frontal and parietal lobes.  3.  Mild to moderate volume loss and presumed chronic small vessel ischemic changes.    CERVICAL SPINE CT:  1.  No CT evidence for acute fracture or post traumatic subluxation.  2.  Advanced degenerative changes as highlighted above.   Cervical spine CT w/o contrast    Impression    IMPRESSION:  HEAD CT:  1.  No acute intracranial hemorrhage or calvarial fracture.  2.  Small zones of encephalomalacia in the left frontal and parietal lobes.  3.  Mild to moderate volume loss and presumed chronic small vessel ischemic changes.    CERVICAL SPINE CT:  1.  No CT evidence for acute fracture or post traumatic subluxation.  2.  Advanced degenerative changes as highlighted above.   XR Chest 1 View    Impression    IMPRESSION: Sternotomy with post-CABG changes. Mild low lung volumes. Heart size upper limits of normal. Mild pulmonary vascular prominence centrally. Patchy bilateral lower lung atelectasis. Patchy opacity in the left upper lung persists from prior   exam. This is unchanged from 10/08/2020. This may relate to left first anterior rib prominence based on CT 01/24/2020. Osseous structures intact.   Comprehensive metabolic panel   Result Value Ref Range    Sodium 146 (H) 136 - 145 mmol/L    Potassium 4.3 3.4 - 5.3 mmol/L    Chloride 107 98 - 107 mmol/L    Carbon Dioxide (CO2) 28 22 - 29 mmol/L    Anion Gap 11 7 - 15 mmol/L    Urea Nitrogen 20.2 8.0 - 23.0 mg/dL    Creatinine 1.05 0.67 - 1.17 mg/dL    Calcium 9.4 8.8 - 10.2 mg/dL    Glucose 124 (H) 70 - 99 mg/dL    Alkaline Phosphatase 83 40 - 129 U/L    AST 18 10 - 50 U/L    ALT 11 10 - 50 U/L    Protein Total 7.0 6.4  - 8.3 g/dL    Albumin 4.4 3.5 - 5.2 g/dL    Bilirubin Total 0.8 <=1.2 mg/dL    GFR Estimate 70 >60 mL/min/1.73m2   Result Value Ref Range    INR 2.41 (H) 0.85 - 1.15   CBC with platelets and differential   Result Value Ref Range    WBC Count 8.7 4.0 - 11.0 10e3/uL    RBC Count 3.92 (L) 4.40 - 5.90 10e6/uL    Hemoglobin 12.2 (L) 13.3 - 17.7 g/dL    Hematocrit 39.0 (L) 40.0 - 53.0 %     78 - 100 fL    MCH 31.1 26.5 - 33.0 pg    MCHC 31.3 (L) 31.5 - 36.5 g/dL    RDW 15.7 (H) 10.0 - 15.0 %    Platelet Count 180 150 - 450 10e3/uL    % Neutrophils 76 %    % Lymphocytes 10 %    % Monocytes 14 %    % Eosinophils 0 %    % Basophils 0 %    % Immature Granulocytes 0 %    NRBCs per 100 WBC 0 <1 /100    Absolute Neutrophils 6.5 1.6 - 8.3 10e3/uL    Absolute Lymphocytes 0.9 0.8 - 5.3 10e3/uL    Absolute Monocytes 1.2 0.0 - 1.3 10e3/uL    Absolute Eosinophils 0.0 0.0 - 0.7 10e3/uL    Absolute Basophils 0.0 0.0 - 0.2 10e3/uL    Absolute Immature Granulocytes 0.0 <=0.4 10e3/uL    Absolute NRBCs 0.0 10e3/uL       EKG:  Performed at: 22 MAY 2023 01:40    Impression: Atrial flutter with a 3-1 block    Rate: 88 bpm  Rhythm: Atrial flutter   Axis: 63 ms  AL Interval: 172 ms  QRS Interval: 92 ms  QTc Interval: 469 ms  ST Changes: None   Comparison: When compared with EKG of 06 OCT 2020, atrial flutter has replaced atrial fibrillation.    I have independently reviewed and interpreted the EKG(s) documented above.      The creation of this record is based on the scribe s observations of the work being performed by Lolita Vicente MD and the provider s statements to them. It was created on her behalf by Uche Martinez, a trained medical scribe. This document has been checked and approved by the attending provider.    Lolita Vicente MD  Emergency Medicine  [facility] EMERGENCY DEPARTMENT  [address] [place] [id number] [phone]  Dept: [phone]     Jules,  Lolita MUNIZ MD  05/22/23 5949

## 2023-05-22 NOTE — DISCHARGE INSTRUCTIONS
I think your weakness today is due to the COVID-19 infection.  Blood work is actually quite normal.  Your INR is 2.4, where it should be.  Pictures of the head, neck, chest are unremarkable.  Make sure you are drinking plenty of fluids and getting plenty of rest.  Use your walker when you get up and ambulate.

## 2023-05-22 NOTE — ED TRIAGE NOTES
Pt arrives via EMS from home for evaluation after a fall. Pt tested positive for Covid yesterday after having a coughfor 2 weeks. This evening he was going to the BR when he fell due to weakness.  Pt states he landed on his right side. He denies any injury from the fall. Denies hitting his head. He is on Warfarin. Wanted to come to ER due to weakness.      Triage Assessment     Row Name 05/22/23 0056       Triage Assessment (Adult)    Airway WDL WDL       Respiratory WDL    Respiratory WDL WDL       Skin Circulation/Temperature WDL    Skin Circulation/Temperature WDL WDL       Cardiac WDL    Cardiac WDL WDL       Peripheral/Neurovascular WDL    Peripheral Neurovascular WDL WDL       Cognitive/Neuro/Behavioral WDL    Cognitive/Neuro/Behavioral WDL WDL